# Patient Record
Sex: MALE | Employment: FULL TIME | URBAN - METROPOLITAN AREA
[De-identification: names, ages, dates, MRNs, and addresses within clinical notes are randomized per-mention and may not be internally consistent; named-entity substitution may affect disease eponyms.]

---

## 2020-07-21 ENCOUNTER — ANCILLARY PROCEDURE (OUTPATIENT)
Dept: MRI IMAGING | Facility: CLINIC | Age: 30
End: 2020-07-21
Attending: PSYCHIATRY & NEUROLOGY

## 2020-07-21 DIAGNOSIS — G35 MULTIPLE SCLEROSIS (H): ICD-10-CM

## 2020-07-21 LAB — RADIOLOGIST FLAGS: NORMAL

## 2020-07-21 RX ORDER — GADOBUTROL 604.72 MG/ML
10 INJECTION INTRAVENOUS ONCE
Status: COMPLETED | OUTPATIENT
Start: 2020-07-21 | End: 2020-07-21

## 2020-07-21 RX ADMIN — GADOBUTROL 10 ML: 604.72 INJECTION INTRAVENOUS at 15:21

## 2020-07-21 NOTE — DISCHARGE INSTRUCTIONS
MRI Contrast Discharge Instructions    The IV contrast you received today will pass out of your body in your  urine. This will happen in the next 24 hours. You will not feel this process.  Your urine will not change color.    Drink at least 4 extra glasses of water or juice today (unless your doctor  has restricted your fluids). This reduces the stress on your kidneys.  You may take your regular medicines.    If you are on dialysis: It is best to have dialysis today.    If you have a reaction: Most reactions happen right away. If you have  any new symptoms after leaving the hospital (such as hives or swelling),  call your hospital at the correct number below. Or call your family doctor.  If you have breathing distress or wheezing, call 911.    Special instructions: ***    I have read and understand the above information.    Signature:______________________________________ Date:___________    Staff:__________________________________________ Date:___________     Time:__________    [facility] Departments:    ___[place]: [phone]  ___[facility]: [phone]  ___[place]: [phone] ___[facility]: [phone]  ___[facility]: [phone]  ___[facility]: [phone]  ___[phone]  ___[facility]: [phone]  ___[phone]

## 2020-08-05 DIAGNOSIS — H46.9 OPTIC NEURITIS: Primary | ICD-10-CM

## 2020-08-05 DIAGNOSIS — E55.9 VITAMIN D DEFICIENCY DISEASE: ICD-10-CM

## 2020-08-05 LAB — MISCELLANEOUS TEST: NORMAL

## 2020-08-05 PROCEDURE — 36415 COLL VENOUS BLD VENIPUNCTURE: CPT | Performed by: PSYCHIATRY & NEUROLOGY

## 2020-08-05 PROCEDURE — 86255 FLUORESCENT ANTIBODY SCREEN: CPT | Mod: 90 | Performed by: PSYCHIATRY & NEUROLOGY

## 2020-08-05 PROCEDURE — 99000 SPECIMEN HANDLING OFFICE-LAB: CPT | Performed by: PSYCHIATRY & NEUROLOGY

## 2020-08-05 PROCEDURE — 82306 VITAMIN D 25 HYDROXY: CPT | Performed by: PSYCHIATRY & NEUROLOGY

## 2020-08-06 LAB — DEPRECATED CALCIDIOL+CALCIFEROL SERPL-MC: 48 UG/L (ref 20–75)

## 2020-08-12 LAB
RESULT: NORMAL
SEND OUTS MISC TEST CODE: NORMAL
SEND OUTS MISC TEST SPECIMEN: NORMAL
TEST NAME: NORMAL

## 2020-08-27 ENCOUNTER — HOSPITAL ENCOUNTER (OUTPATIENT)
Dept: MRI IMAGING | Facility: CLINIC | Age: 30
End: 2020-08-27
Attending: PSYCHIATRY & NEUROLOGY
Payer: COMMERCIAL

## 2020-08-27 DIAGNOSIS — G35 MULTIPLE SCLEROSIS (H): ICD-10-CM

## 2020-08-27 DIAGNOSIS — G35 MS (MULTIPLE SCLEROSIS) (H): ICD-10-CM

## 2020-08-27 PROCEDURE — 72157 MRI CHEST SPINE W/O & W/DYE: CPT

## 2020-08-27 PROCEDURE — A9585 GADOBUTROL INJECTION: HCPCS | Performed by: RADIOLOGY

## 2020-08-27 PROCEDURE — 25500064 ZZH RX 255 OP 636: Performed by: RADIOLOGY

## 2020-08-27 PROCEDURE — 72156 MRI NECK SPINE W/O & W/DYE: CPT

## 2020-08-27 RX ORDER — GADOBUTROL 604.72 MG/ML
10 INJECTION INTRAVENOUS ONCE
Status: COMPLETED | OUTPATIENT
Start: 2020-08-27 | End: 2020-08-27

## 2020-08-27 RX ADMIN — GADOBUTROL 10 ML: 604.72 INJECTION INTRAVENOUS at 16:37

## 2020-09-30 DIAGNOSIS — G35 MS (MULTIPLE SCLEROSIS) (H): Primary | ICD-10-CM

## 2020-09-30 LAB
BASOPHILS # BLD AUTO: 0.1 10E9/L (ref 0–0.2)
BASOPHILS NFR BLD AUTO: 0.7 %
DIFFERENTIAL METHOD BLD: NORMAL
EOSINOPHIL # BLD AUTO: 0.4 10E9/L (ref 0–0.7)
EOSINOPHIL NFR BLD AUTO: 5 %
ERYTHROCYTE [DISTWIDTH] IN BLOOD BY AUTOMATED COUNT: 13.6 % (ref 10–15)
HCT VFR BLD AUTO: 48.3 % (ref 40–53)
HGB BLD-MCNC: 16.5 G/DL (ref 13.3–17.7)
LYMPHOCYTES # BLD AUTO: 2 10E9/L (ref 0.8–5.3)
LYMPHOCYTES NFR BLD AUTO: 27 %
MCH RBC QN AUTO: 28 PG (ref 26.5–33)
MCHC RBC AUTO-ENTMCNC: 34.2 G/DL (ref 31.5–36.5)
MCV RBC AUTO: 82 FL (ref 78–100)
MONOCYTES # BLD AUTO: 0.6 10E9/L (ref 0–1.3)
MONOCYTES NFR BLD AUTO: 7.6 %
NEUTROPHILS # BLD AUTO: 4.4 10E9/L (ref 1.6–8.3)
NEUTROPHILS NFR BLD AUTO: 59.7 %
PLATELET # BLD AUTO: 331 10E9/L (ref 150–450)
RBC # BLD AUTO: 5.89 10E12/L (ref 4.4–5.9)
WBC # BLD AUTO: 7.4 10E9/L (ref 4–11)

## 2020-09-30 PROCEDURE — 86787 VARICELLA-ZOSTER ANTIBODY: CPT

## 2020-09-30 PROCEDURE — 85025 COMPLETE CBC W/AUTO DIFF WBC: CPT

## 2020-09-30 PROCEDURE — 36415 COLL VENOUS BLD VENIPUNCTURE: CPT

## 2020-09-30 PROCEDURE — 80076 HEPATIC FUNCTION PANEL: CPT

## 2020-10-01 ENCOUNTER — ALLIED HEALTH/NURSE VISIT (OUTPATIENT)
Dept: FAMILY MEDICINE | Facility: CLINIC | Age: 30
End: 2020-10-01
Payer: COMMERCIAL

## 2020-10-01 DIAGNOSIS — G35 MULTIPLE SCLEROSIS (H): Primary | ICD-10-CM

## 2020-10-01 LAB
ALBUMIN SERPL-MCNC: 4.2 G/DL (ref 3.4–5)
ALP SERPL-CCNC: 99 U/L (ref 40–150)
ALT SERPL W P-5'-P-CCNC: 51 U/L (ref 0–70)
AST SERPL W P-5'-P-CCNC: 23 U/L (ref 0–45)
BILIRUB DIRECT SERPL-MCNC: <0.1 MG/DL (ref 0–0.2)
BILIRUB SERPL-MCNC: 0.4 MG/DL (ref 0.2–1.3)
PROT SERPL-MCNC: 8.2 G/DL (ref 6.8–8.8)

## 2020-10-01 PROCEDURE — 99207 PR NO CHARGE NURSE ONLY: CPT

## 2020-10-01 PROCEDURE — 93005 ELECTROCARDIOGRAM TRACING: CPT

## 2020-10-02 LAB — VZV IGG SER QL IA: 7 AI (ref 0–0.8)

## 2021-01-04 ENCOUNTER — HEALTH MAINTENANCE LETTER (OUTPATIENT)
Age: 31
End: 2021-01-04

## 2021-03-15 ENCOUNTER — IMMUNIZATION (OUTPATIENT)
Dept: NURSING | Facility: CLINIC | Age: 31
End: 2021-03-15
Payer: COMMERCIAL

## 2021-03-15 PROCEDURE — 91300 PR COVID VAC PFIZER DIL RECON 30 MCG/0.3 ML IM: CPT

## 2021-03-15 PROCEDURE — 0001A PR COVID VAC PFIZER DIL RECON 30 MCG/0.3 ML IM: CPT

## 2021-04-05 ENCOUNTER — IMMUNIZATION (OUTPATIENT)
Dept: NURSING | Facility: CLINIC | Age: 31
End: 2021-04-05
Attending: INTERNAL MEDICINE
Payer: COMMERCIAL

## 2021-04-05 PROCEDURE — 91300 PR COVID VAC PFIZER DIL RECON 30 MCG/0.3 ML IM: CPT

## 2021-04-05 PROCEDURE — 0002A PR COVID VAC PFIZER DIL RECON 30 MCG/0.3 ML IM: CPT

## 2021-04-16 ENCOUNTER — ANCILLARY PROCEDURE (OUTPATIENT)
Dept: MRI IMAGING | Facility: CLINIC | Age: 31
End: 2021-04-16
Attending: PSYCHIATRY & NEUROLOGY
Payer: COMMERCIAL

## 2021-04-16 DIAGNOSIS — G35 MULTIPLE SCLEROSIS (H): ICD-10-CM

## 2021-04-16 PROCEDURE — A9585 GADOBUTROL INJECTION: HCPCS | Performed by: RADIOLOGY

## 2021-04-16 PROCEDURE — 70553 MRI BRAIN STEM W/O & W/DYE: CPT | Mod: TC | Performed by: RADIOLOGY

## 2021-04-16 RX ORDER — GADOBUTROL 604.72 MG/ML
10 INJECTION INTRAVENOUS ONCE
Status: COMPLETED | OUTPATIENT
Start: 2021-04-16 | End: 2021-04-16

## 2021-04-16 RX ADMIN — GADOBUTROL 10 ML: 604.72 INJECTION INTRAVENOUS at 14:51

## 2021-08-31 ENCOUNTER — IMMUNIZATION (OUTPATIENT)
Dept: NURSING | Facility: CLINIC | Age: 31
End: 2021-08-31
Payer: COMMERCIAL

## 2021-08-31 PROCEDURE — 0003A PR COVID VAC PFIZER DIL RECON 30 MCG/0.3 ML IM: CPT

## 2021-08-31 PROCEDURE — 91300 PR COVID VAC PFIZER DIL RECON 30 MCG/0.3 ML IM: CPT

## 2021-10-10 ENCOUNTER — HEALTH MAINTENANCE LETTER (OUTPATIENT)
Age: 31
End: 2021-10-10

## 2021-12-23 ENCOUNTER — E-VISIT (OUTPATIENT)
Dept: URGENT CARE | Facility: CLINIC | Age: 31
End: 2021-12-23
Payer: COMMERCIAL

## 2021-12-23 DIAGNOSIS — J02.9 SORE THROAT: ICD-10-CM

## 2021-12-23 DIAGNOSIS — Z20.822 SUSPECTED COVID-19 VIRUS INFECTION: ICD-10-CM

## 2021-12-23 PROCEDURE — 99421 OL DIG E/M SVC 5-10 MIN: CPT | Performed by: EMERGENCY MEDICINE

## 2021-12-23 NOTE — PATIENT INSTRUCTIONS
Dear Rohan Patterson,    Your symptoms show that you may have coronavirus (COVID-19). This illness can cause fever, cough and trouble breathing. Many people get a mild case and get better on their own. Some people can get very sick.    Because you also reported sore throat I would like to also test you for Strep Throat to determine if we need to treat you for that as well.    What should I do?  We would like to test you for Covid-19 virus and Strep Throat. I have placed orders for these tests.     For all employees or close contacts (except [place] and [employer] - see below), go to your iLike home page and scroll down to the section that says  You have an appointment that needs to be scheduled  and click the large green button that says  Schedule Now  and follow the steps to find the next available opening.  It is important that when you are asked what the reason for your appointment is that you mention you need BOTH Covid and Strep tests.  tests.     If you are unable to complete these steps or if you cannot find any available times, please call [phone] to schedule employee testing.     [place] employees or close contacts, please call [phone].   [place] [employer]) employees or close contacts call [phone].    Return to work/school/ guidance:  Please let your workplace manager and staffing office know when your quarantine ends     We can t give you an exact date as it depends on the above. You can calculate this on your own or work with your manager/staffing office to calculate this. (For example if you were exposed on 10/4, you would have to quarantine for 14 full days. That would be through 10/18. You could return on 10/19.)      If you receive a positive COVID-19 test result, follow the guidance of the those who are giving you the results. Usually the return to work is 10 (or in some cases 20 days from symptom onset.) If you work at [employer], you must also be cleared by  Employee Occupational Health and Safety to return to work.        If you receive a negative COVID-19 test result and did not have a high risk exposure to someone with a known positive COVID-19 test, you can return to work once you're free of fever for 24 hours without fever-reducing medication and your symptoms are improving or resolved.      If you receive a negative COVID-19 test and If you had a high risk exposure to someone who has tested positive for COVID-19 then you can return to work 14 days after your last contact with the positive individual    Note: If you have ongoing exposure to the covid positive person, this quarantine period may be more than 14 days. (For example, if you are continued to be exposed to your child who tested positive and cannot isolate from them, then the quarantine of 7-14 days can't start until your child is no longer contagious. This is typically 10 days from onset of the child's symptoms. So the total duration may be 17-24 days in this case.)    Sign up for [employer].   We know it's scary to hear that you might have COVID-19. We want to track your symptoms to make sure you're okay over the next 2 weeks. Please look for an email from [employer]--this is a free, online program that we'll use to keep in touch. To sign up, follow the link in the email you will receive. Learn more at [email or web address]    How can I take care of myself?    Get lots of rest. Drink extra fluids (unless a doctor has told you not to)    Take Tylenol (acetaminophen) or ibuprofen for fever or pain. If you have liver or kidney problems, ask your family doctor if it's okay to take Tylenol o ibuprofen    If you have other health problems (like cancer, heart failure, an organ transplant or severe kidney disease): Call your specialty clinic if you don't feel better in the next 2 days.    Know when to call 911. Emergency warning signs include:  o Trouble breathing or shortness of breath  o Pain  or pressure in the chest that doesn't go away  o Feeling confused like you haven't felt before, or not being able to wake up  o Bluish-colored lips or face    Where can I get more information?  [facility] - About COVID-19:   [email or web address]    CDC - What to Do If You're Sick:   www.cdc.gov/coronavirus/2019-ncov/about/steps-when-sick.html      December 23, 2021  RE:  Rohan Patterson                                                                                                                  [facility] [place] [address]      To whom it may concern:    I evaluated Rohan Patterson on December 23, 2021. Rohan Patterson should be excused from work/school.     They should let their workplace manager and staffing office know when their quarantine ends.    We can not give an exact date as it depends on the information below. They can calculate this on their own or work with their manager/staffing office to calculate this. (For example if they were exposed on 10/04, they would have to quarantine for 14 full days. That would be through 10/18. They could return on 10/19.)    Quarantine Guidelines:      If patient receives a positive COVID-19 test result, they should follow the guidance of those who are giving the results. Usually the return to work is 10 (or in some cases 20 days from symptom onset.) If they work at [employer], they must be cleared by Employee Occupational Health and Safety to return to work.        If patient receives a negative COVID-19 test result and did not have a high risk exposure to someone with a known positive COVID-19 test, they can return to work once they're free of fever for 24 hours without fever-reducing medication and their symptoms are improving or resolved.      If patient receives a negative COVID-19 test and if they had a high risk exposure to someone who has tested positive for COVID-19 then they can return to work 14 days after their last  contact with the positive individual    Note: If there is ongoing exposure to the covid positive person, this quarantine period may be longer than 14 days. (For example, if they are continually exposed to their child, who tested positive and cannot isolate from them, then the quarantine of 7-14 days can't start until their child is no longer contagious. This is typically 10 days from onset to the child's symptoms. So the total duration may be 17-24 days in this case.)     Sincerely,  Ward Lafleur MD

## 2021-12-26 ENCOUNTER — E-VISIT (OUTPATIENT)
Dept: FAMILY MEDICINE | Facility: CLINIC | Age: 31
End: 2021-12-26
Payer: COMMERCIAL

## 2021-12-26 DIAGNOSIS — Z20.822 SUSPECTED COVID-19 VIRUS INFECTION: ICD-10-CM

## 2021-12-26 DIAGNOSIS — J02.9 SORE THROAT: ICD-10-CM

## 2021-12-26 PROCEDURE — 99421 OL DIG E/M SVC 5-10 MIN: CPT | Performed by: PHYSICIAN ASSISTANT

## 2021-12-26 RX ORDER — ACETAMINOPHEN 500 MG
500-1000 TABLET ORAL EVERY 6 HOURS PRN
Qty: 30 TABLET | Refills: 0 | Status: SHIPPED | OUTPATIENT
Start: 2021-12-26 | End: 2024-05-21

## 2021-12-26 RX ORDER — OMEGA-3 FATTY ACIDS/FISH OIL 300-1000MG
200 CAPSULE ORAL EVERY 4 HOURS PRN
Qty: 30 CAPSULE | Refills: 0 | Status: SHIPPED | OUTPATIENT
Start: 2021-12-26

## 2021-12-26 NOTE — PATIENT INSTRUCTIONS
Dear Rohan Patterson,    Alternate Tylenol and Ibuprofen for pain. Gargle with hot salty water. Have warm tea with honey.     Because you also reported sore throat I would like to also test you for Strep Throat to determine if we need to treat you for that as well.    What should I do?  We would like to test you for Covid-19 virus and Strep Throat. I have placed orders for these tests.     For all employees or close contacts (except [place] and [employer] - see below), go to your [employer] home page and scroll down to the section that says  You have an appointment that needs to be scheduled  and click the large green button that says  Schedule Now  and follow the steps to find the next available opening.  It is important that when you are asked what the reason for your appointment is that you mention you need BOTH Covid and Strep tests.  tests.     If you are unable to complete these steps or if you cannot find any available times, please call [phone] to schedule employee testing.     [place] employees or close contacts, please call [phone].   [place] ([employer]) employees or close contacts call [phone].    Return to work/school/ guidance:  Please let your workplace manager and staffing office know when your quarantine ends     We can t give you an exact date as it depends on the above. You can calculate this on your own or work with your manager/staffing office to calculate this. (For example if you were exposed on 10/4, you would have to quarantine for 14 full days. That would be through 10/18. You could return on 10/19.)      If you receive a positive COVID-19 test result, follow the guidance of the those who are giving you the results. Usually the return to work is 10 (or in some cases 20 days from symptom onset.) If you work at [employer], you must also be cleared by Employee Occupational Health and Safety to return to work.        If you receive a negative COVID-19 test result and  did not have a high risk exposure to someone with a known positive COVID-19 test, you can return to work once you're free of fever for 24 hours without fever-reducing medication and your symptoms are improving or resolved.      If you receive a negative COVID-19 test and If you had a high risk exposure to someone who has tested positive for COVID-19 then you can return to work 14 days after your last contact with the positive individual    Note: If you have ongoing exposure to the covid positive person, this quarantine period may be more than 14 days. (For example, if you are continued to be exposed to your child who tested positive and cannot isolate from them, then the quarantine of 7-14 days can't start until your child is no longer contagious. This is typically 10 days from onset of the child's symptoms. So the total duration may be 17-24 days in this case.)    Sign up for [employer].   We know it's scary to hear that you might have COVID-19. We want to track your symptoms to make sure you're okay over the next 2 weeks. Please look for an email from [employer]--this is a free, online program that we'll use to keep in touch. To sign up, follow the link in the email you will receive. Learn more at [email or web address]    How can I take care of myself?    Get lots of rest. Drink extra fluids (unless a doctor has told you not to)    Take Tylenol (acetaminophen) or ibuprofen for fever or pain. If you have liver or kidney problems, ask your family doctor if it's okay to take Tylenol o ibuprofen    If you have other health problems (like cancer, heart failure, an organ transplant or severe kidney disease): Call your specialty clinic if you don't feel better in the next 2 days.    Know when to call 911. Emergency warning signs include:  o Trouble breathing or shortness of breath  o Pain or pressure in the chest that doesn't go away  o Feeling confused like you haven't felt before, or not being able to  wake up  o Bluish-colored lips or face    Where can I get more information?  [facility] - About COVID-19:   [email or web address]    CDC - What to Do If You're Sick:   www.cdc.gov/coronavirus/2019-ncov/about/steps-when-sick.html

## 2022-01-30 ENCOUNTER — HEALTH MAINTENANCE LETTER (OUTPATIENT)
Age: 32
End: 2022-01-30

## 2022-05-17 ENCOUNTER — TELEPHONE (OUTPATIENT)
Dept: NEUROLOGY | Facility: CLINIC | Age: 32
End: 2022-05-17
Payer: COMMERCIAL

## 2022-05-17 NOTE — TELEPHONE ENCOUNTER
Health Call Center    Phone Message    May a detailed message be left on voicemail: yes     Reason for Call: Other: Rohan is a patient of Dr. Man and wants to transfer his care to us. Please review and contact the patient to schedule with Dr. Man.     Action Taken: Message routed to:  [facility] Neurology    Travel Screening: Not Applicable

## 2022-05-18 NOTE — TELEPHONE ENCOUNTER
Action 5/18/22 MV 12.33pm   Action Taken Imaging requset faxed to [facility] + [facility]    5/22/22 MV 9.38am  [facility] images resolved in PACS - waiting for [facility] images    6/1/22 MV 1.05pm  2nd request faxed to  for images  UPDATE 1.26pm: images resolved in PACS         RECORDS RECEIVED FROM: self (prev pt)   REASON FOR VISIT: MS   Date of Appt: 6/8/22   NOTES (FOR ALL VISITS) STATUS DETAILS   OFFICE NOTE from other specialist Care Everywhere Dr Elizabeth Man @  Neuro:  11/25/20 8/11/20 5/11/20 2/7/20 1/24/20    Dr Kate Chawla @ [facility]:  7/30/20 2/20/20 1/16/20    Dr Wilian Guadalupe @ [facility]:  1/16/20   DISCHARGE REPORT from the ER Care Everywhere [facility]:  1/15/20   MEDICATION LIST Care Everywhere    IMAGING  (FOR ALL VISITS)     LUMBAR PUNCTURE Care Everywhere [facility]:  1/27/20   MRI (HEAD, NECK, SPINE) Internal/Received [facility]:  MRI Thoracic Spine 5/26/22  MRI Cervical Spine 5/26/22  MRI Brain 5/26/22  MRI [facility] 4/16/21    [facility]:  MRI Cervical Spine 8/27/20  MRI Thoracic Spine 8/27/20    [facility]:  MRI Brain 7/21/20    [facility]:  MRI Thoracic Spine 1/31/20  MRI Cervical Spine 1/31/20    [facility];  MRI Brain 1/16/20  MRI [facility] 1/16/20

## 2022-06-06 ENCOUNTER — ANCILLARY PROCEDURE (OUTPATIENT)
Dept: MRI IMAGING | Facility: CLINIC | Age: 32
End: 2022-06-06
Attending: PSYCHIATRY & NEUROLOGY
Payer: COMMERCIAL

## 2022-06-06 DIAGNOSIS — G35 MULTIPLE SCLEROSIS (H): ICD-10-CM

## 2022-06-06 PROCEDURE — A9585 GADOBUTROL INJECTION: HCPCS | Mod: JW | Performed by: RADIOLOGY

## 2022-06-06 PROCEDURE — 70553 MRI BRAIN STEM W/O & W/DYE: CPT | Mod: TC | Performed by: RADIOLOGY

## 2022-06-06 PROCEDURE — 72157 MRI CHEST SPINE W/O & W/DYE: CPT | Mod: TC | Performed by: RADIOLOGY

## 2022-06-06 RX ORDER — GADOBUTROL 604.72 MG/ML
0.1 INJECTION INTRAVENOUS ONCE
Status: COMPLETED | OUTPATIENT
Start: 2022-06-06 | End: 2022-06-06

## 2022-06-06 RX ADMIN — GADOBUTROL 11 ML: 604.72 INJECTION INTRAVENOUS at 15:26

## 2022-06-08 ENCOUNTER — PRE VISIT (OUTPATIENT)
Dept: NEUROLOGY | Facility: CLINIC | Age: 32
End: 2022-06-08
Payer: COMMERCIAL

## 2022-06-08 ENCOUNTER — LAB (OUTPATIENT)
Dept: LAB | Facility: CLINIC | Age: 32
End: 2022-06-08
Payer: COMMERCIAL

## 2022-06-08 ENCOUNTER — OFFICE VISIT (OUTPATIENT)
Dept: NEUROLOGY | Facility: CLINIC | Age: 32
End: 2022-06-08
Attending: PSYCHIATRY & NEUROLOGY
Payer: COMMERCIAL

## 2022-06-08 VITALS
BODY MASS INDEX: 31.83 KG/M2 | OXYGEN SATURATION: 100 % | WEIGHT: 248 LBS | SYSTOLIC BLOOD PRESSURE: 137 MMHG | DIASTOLIC BLOOD PRESSURE: 89 MMHG | HEIGHT: 74 IN

## 2022-06-08 DIAGNOSIS — E55.9 VITAMIN D DEFICIENCY: ICD-10-CM

## 2022-06-08 DIAGNOSIS — G35 MS (MULTIPLE SCLEROSIS) (H): ICD-10-CM

## 2022-06-08 DIAGNOSIS — G35 MS (MULTIPLE SCLEROSIS) (H): Primary | ICD-10-CM

## 2022-06-08 LAB
BASOPHILS # BLD AUTO: 0 10E3/UL (ref 0–0.2)
BASOPHILS NFR BLD AUTO: 1 %
DEPRECATED CALCIDIOL+CALCIFEROL SERPL-MC: 31 UG/L (ref 20–75)
EOSINOPHIL # BLD AUTO: 0.1 10E3/UL (ref 0–0.7)
EOSINOPHIL NFR BLD AUTO: 3 %
ERYTHROCYTE [DISTWIDTH] IN BLOOD BY AUTOMATED COUNT: 13 % (ref 10–15)
HCT VFR BLD AUTO: 45.4 % (ref 40–53)
HGB BLD-MCNC: 14.9 G/DL (ref 13.3–17.7)
IMM GRANULOCYTES # BLD: 0 10E3/UL
IMM GRANULOCYTES NFR BLD: 0 %
LYMPHOCYTES # BLD AUTO: 0.4 10E3/UL (ref 0.8–5.3)
LYMPHOCYTES NFR BLD AUTO: 16 %
MCH RBC QN AUTO: 28 PG (ref 26.5–33)
MCHC RBC AUTO-ENTMCNC: 32.8 G/DL (ref 31.5–36.5)
MCV RBC AUTO: 85 FL (ref 78–100)
MONOCYTES # BLD AUTO: 0.4 10E3/UL (ref 0–1.3)
MONOCYTES NFR BLD AUTO: 18 %
NEUTROPHILS # BLD AUTO: 1.5 10E3/UL (ref 1.6–8.3)
NEUTROPHILS NFR BLD AUTO: 62 %
NRBC # BLD AUTO: 0 10E3/UL
NRBC BLD AUTO-RTO: 0 /100
PLATELET # BLD AUTO: 244 10E3/UL (ref 150–450)
RBC # BLD AUTO: 5.33 10E6/UL (ref 4.4–5.9)
WBC # BLD AUTO: 2.4 10E3/UL (ref 4–11)

## 2022-06-08 PROCEDURE — G0463 HOSPITAL OUTPT CLINIC VISIT: HCPCS

## 2022-06-08 PROCEDURE — 99214 OFFICE O/P EST MOD 30 MIN: CPT | Performed by: PSYCHIATRY & NEUROLOGY

## 2022-06-08 PROCEDURE — 85025 COMPLETE CBC W/AUTO DIFF WBC: CPT | Performed by: PATHOLOGY

## 2022-06-08 PROCEDURE — 99000 SPECIMEN HANDLING OFFICE-LAB: CPT | Performed by: PATHOLOGY

## 2022-06-08 PROCEDURE — 82306 VITAMIN D 25 HYDROXY: CPT | Mod: 90 | Performed by: PATHOLOGY

## 2022-06-08 PROCEDURE — 36415 COLL VENOUS BLD VENIPUNCTURE: CPT | Performed by: PATHOLOGY

## 2022-06-08 RX ORDER — FINGOLIMOD HYDROCHLORIDE 0.5 MG/1
1 CAPSULE ORAL DAILY
COMMUNITY
Start: 2021-01-01 | End: 2022-06-08

## 2022-06-08 RX ORDER — FINGOLIMOD HYDROCHLORIDE 0.5 MG/1
0.5 CAPSULE ORAL DAILY
Qty: 30 CAPSULE | Refills: 11 | Status: SHIPPED | OUTPATIENT
Start: 2022-06-08 | End: 2023-02-27

## 2022-06-08 NOTE — PROGRESS NOTES
"Date of Service: 6/8/2022    [facility] Neurology   MS Clinic Follow-up     Subjective: 32-year-old otherwise healthy man who presents in follow-up for multiple sclerosis.    He has not experienced any new symptoms related to multiple sclerosis.  He has not experienced any recurrence of tingling.    He did have COVID in December.  He was fairly sick for approximately 4 days.  He did receive IV antibody treatment.  This was effective.    He has not noticed any substantial side effects of Gilenya.  He has not struggled with infections otherwise.    D3 2000 international unit(s) daily    Disease onset; R ON, 1/2020, age 29  Last relapse: \"    DMD hx:   Glatiramer acetate 40 mg 2/2020 - 9/2020, radiologic progression  Gilenya 10/14/20 - present      No Known Allergies    Current Outpatient Medications   Medication     fingolimod (GILENYA) 0.5 MG capsule     vitamin D3 (CHOLECALCIFEROL) 1.25 MG (68305 UT) capsule     acetaminophen (TYLENOL) 500 MG tablet     ibuprofen (ADVIL/MOTRIN) 200 MG capsule     No current facility-administered medications for this visit.        Past medical, surgical, social and family history was personally reviewed. Pertinent details noted above.     Physical Examination:   /89 (BP Location: Left arm, Patient Position: Chair, Cuff Size: Adult Large)   Ht 1.88 m (6' 2\")   Wt 112.5 kg (248 lb)   SpO2 100%   BMI 31.84 kg/m      General: no acute distress  Cranial nerves:   VFFC  PERRL w/no RAPD  EOM full w/no NABIL   Face symmetric  Hearing intact  No dysarthria   Motor:   Tone is normal   Bulk is normal     R L  Deltoid  5 5  Biceps  5 5  Triceps 5 5  Wrist ext 5 5  Finger ext 5 5  Finger abd 5 5    Hip flexion 5 5  Knee flexion 5 5  Knee ext 5 5  Ankle d/f 5 5    Reflexes: 2+ and symmetric throughout, babinski absent bilaterally  Sensory: vibration is minimally reduced in the toes, JPS normal in the toes   Romberg is absent  Coordination: no ataxia or dysmetria  Gait: normal base and " stride, tandem gait is intact, able to balance on one foot and hop x 5 bilaterally    Tests/Imaging:   CSF   9 OCB  3 wbc  IgG index normal     MOG neg  AQP4 neg    NESSA virus Ab n/t  Vitamin D 20    Abs lymph 300     MRI brain   1/2020 - several periventricular demyelinating lesions, 1 gd+; longitudinally extensive enhancement of the right optic nerve  7/2020 - 2 gd+ lesions  4/2021 - no new lesions, gd-   6/2022 - no new lesions, gd-     MRI cervical spine   1/2020 - no demyelinating lesions  6/2022 - no definite lesions,2 spots but favor artifact b/c only noted on one view    MRI thoracic spine   1/2020 - no demyelinating lesions   6/2022 - no lesions    Assessment: 32-year-old man with relapsing remitting multiple sclerosis who appears to be clinically and radiologically stable on Gilenya.  MRI imaging of the cervical spine was personally reviewed with the patient.  I explained why the small spots noted are favored to be artifact.  Artifact would also correspond with his clinical stability.    Vitamin D deficiency. Will reassess today.     Plan:   -Blood work today  - Continue Gilenya  - Follow-up in 6 months    Note was completed with the assistance of Dragon Fluency software which can often result in accidental word substitutions.     A total of 30 minutes on the date of service were spent in the care of this patient.   Elizabeth Man MD on 6/8/2022 at 2:36 PM

## 2022-06-08 NOTE — LETTER
Date:June 14, 2022      Provider requested that no letter be sent. Do not send.       [facility]

## 2022-06-08 NOTE — LETTER
"6/8/2022       RE: Rohan Patterson  [address] [place] [address]     Dear Colleague,    Thank you for referring your patient, Rohan Patterson, to the [facility] MULTIPLE SCLEROSIS CLINIC [place] [facility]. Please see a copy of my visit note below.    Date of Service: 6/8/2022    [facility] Neurology   MS Clinic Follow-up     Subjective: 32-year-old otherwise healthy man who presents in follow-up for multiple sclerosis.    He has not experienced any new symptoms related to multiple sclerosis.  He has not experienced any recurrence of tingling.    He did have COVID in December.  He was fairly sick for approximately 4 days.  He did receive IV antibody treatment.  This was effective.    He has not noticed any substantial side effects of Gilenya.  He has not struggled with infections otherwise.    D3 2000 international unit(s) daily    Disease onset; R ON, 1/2020, age 29  Last relapse: \"    DMD hx:   Glatiramer acetate 40 mg 2/2020 - 9/2020, radiologic progression  Gilenya 10/14/20 - present      No Known Allergies    Current Outpatient Medications   Medication     fingolimod (GILENYA) 0.5 MG capsule     vitamin D3 (CHOLECALCIFEROL) 1.25 MG (07482 UT) capsule     acetaminophen (TYLENOL) 500 MG tablet     ibuprofen (ADVIL/MOTRIN) 200 MG capsule     No current facility-administered medications for this visit.        Past medical, surgical, social and family history was personally reviewed. Pertinent details noted above.     Physical Examination:   /89 (BP Location: Left arm, Patient Position: Chair, Cuff Size: Adult Large)   Ht 1.88 m (6' 2\")   Wt 112.5 kg (248 lb)   SpO2 100%   BMI 31.84 kg/m      General: no acute distress  Cranial nerves:   VFFC  PERRL w/no RAPD  EOM full w/no NABIL   Face symmetric  Hearing intact  No dysarthria   Motor:   Tone is normal   Bulk is normal     R L  Deltoid  5 5  Biceps  5 5  Triceps 5 5  Wrist " ext 5 5  Finger ext 5 5  Finger abd 5 5    Hip flexion 5 5  Knee flexion 5 5  Knee ext 5 5  Ankle d/f 5 5    Reflexes: 2+ and symmetric throughout, babinski absent bilaterally  Sensory: vibration is minimally reduced in the toes, JPS normal in the toes   Romberg is absent  Coordination: no ataxia or dysmetria  Gait: normal base and stride, tandem gait is intact, able to balance on one foot and hop x 5 bilaterally    Tests/Imaging:   CSF   9 OCB  3 wbc  IgG index normal     MOG neg  AQP4 neg    NESSA virus Ab n/t  Vitamin D 20    Abs lymph 300     MRI brain   1/2020 - several periventricular demyelinating lesions, 1 gd+; longitudinally extensive enhancement of the right optic nerve  7/2020 - 2 gd+ lesions  4/2021 - no new lesions, gd-   6/2022 - no new lesions, gd-     MRI cervical spine   1/2020 - no demyelinating lesions  6/2022 - no definite lesions,2 spots but favor artifact b/c only noted on one view    MRI thoracic spine   1/2020 - no demyelinating lesions   6/2022 - no lesions    Assessment: 32-year-old man with relapsing remitting multiple sclerosis who appears to be clinically and radiologically stable on Gilenya.  MRI imaging of the cervical spine was personally reviewed with the patient.  I explained why the small spots noted are favored to be artifact.  Artifact would also correspond with his clinical stability.    Vitamin D deficiency. Will reassess today.     Plan:   -Blood work today  - Continue Gilenya  - Follow-up in 6 months    Note was completed with the assistance of Dragon Fluency software which can often result in accidental word substitutions.     A total of 30 minutes on the date of service were spent in the care of this patient.   Elizabeth Man MD on 6/8/2022 at 2:36 PM          Again, thank you for allowing me to participate in the care of your patient.      Sincerely,    Elizabeth Man MD

## 2022-06-09 DIAGNOSIS — G35 MS (MULTIPLE SCLEROSIS) (H): Primary | ICD-10-CM

## 2022-06-09 DIAGNOSIS — E55.9 VITAMIN D DEFICIENCY: ICD-10-CM

## 2022-09-13 ENCOUNTER — TELEPHONE (OUTPATIENT)
Dept: NEUROLOGY | Facility: CLINIC | Age: 32
End: 2022-09-13

## 2022-09-13 NOTE — TELEPHONE ENCOUNTER
PA Initiation    Medication: Gilenya 0.5MG Capsules (PA PENDING)  Insurance Company: [employer] - Phone [phone] Fax [phone]  Pharmacy Filling the Rx: [facility] - [place], [place] - [address]  Filling Pharmacy Phone: [phone]  Filling Pharmacy Fax: [phone]  Start Date: 9/13/2022        Thank you,    Margaret Fonseca [facility]  Specialty Pharmacy Clinic Liaison - CardiologyNeurologyMultiple Sclerosis  [facility] [address] [address]  Ph: [phone] Fax: [phone]  [email or web address]

## 2022-09-14 NOTE — TELEPHONE ENCOUNTER
Prior Authorization Approval    Authorization Effective Date: 9/14/2022  Authorization Expiration Date: 9/14/2023  Medication: Gilenya 0.5MG Capsules (PA APPROVED)  Approved Dose/Quantity: 30 days  Reference #:     Insurance Company: [employer] - Phone [phone] Fax [phone]  Expected CoPay:       CoPay Card Available: Yes    Foundation Assistance Needed:    Which Pharmacy is filling the prescription (Not needed for infusion/clinic administered): [facility] - [place], [place] - [address] [facility]  Pharmacy Notified:    Patient Notified:            Thank you,    Margaret Fonseca h-T  Specialty Pharmacy Clinic Liaison - CardiologyNeurologyMultiple [facility]  [address] [place] [id number]  Ph: [phone] Fax: [phone]  [email or web address]

## 2022-09-24 ENCOUNTER — HEALTH MAINTENANCE LETTER (OUTPATIENT)
Age: 32
End: 2022-09-24

## 2022-12-01 ENCOUNTER — LAB (OUTPATIENT)
Dept: LAB | Facility: CLINIC | Age: 32
End: 2022-12-01
Payer: COMMERCIAL

## 2022-12-01 DIAGNOSIS — E55.9 VITAMIN D DEFICIENCY: ICD-10-CM

## 2022-12-01 DIAGNOSIS — G35 MS (MULTIPLE SCLEROSIS) (H): ICD-10-CM

## 2022-12-01 LAB
BASOPHILS # BLD AUTO: 0 10E3/UL (ref 0–0.2)
BASOPHILS NFR BLD AUTO: 1 %
DEPRECATED CALCIDIOL+CALCIFEROL SERPL-MC: 59 UG/L (ref 20–75)
EOSINOPHIL # BLD AUTO: 0.1 10E3/UL (ref 0–0.7)
EOSINOPHIL NFR BLD AUTO: 4 %
ERYTHROCYTE [DISTWIDTH] IN BLOOD BY AUTOMATED COUNT: 13.2 % (ref 10–15)
HCT VFR BLD AUTO: 43.7 % (ref 40–53)
HGB BLD-MCNC: 14.5 G/DL (ref 13.3–17.7)
IMM GRANULOCYTES # BLD: 0 10E3/UL
IMM GRANULOCYTES NFR BLD: 0 %
LYMPHOCYTES # BLD AUTO: 0.6 10E3/UL (ref 0.8–5.3)
LYMPHOCYTES NFR BLD AUTO: 21 %
MCH RBC QN AUTO: 27.9 PG (ref 26.5–33)
MCHC RBC AUTO-ENTMCNC: 33.2 G/DL (ref 31.5–36.5)
MCV RBC AUTO: 84 FL (ref 78–100)
MONOCYTES # BLD AUTO: 0.5 10E3/UL (ref 0–1.3)
MONOCYTES NFR BLD AUTO: 18 %
NEUTROPHILS # BLD AUTO: 1.5 10E3/UL (ref 1.6–8.3)
NEUTROPHILS NFR BLD AUTO: 56 %
NRBC # BLD AUTO: 0 10E3/UL
NRBC BLD AUTO-RTO: 0 /100
PLATELET # BLD AUTO: 234 10E3/UL (ref 150–450)
RBC # BLD AUTO: 5.2 10E6/UL (ref 4.4–5.9)
WBC # BLD AUTO: 2.7 10E3/UL (ref 4–11)

## 2022-12-01 PROCEDURE — 99000 SPECIMEN HANDLING OFFICE-LAB: CPT | Performed by: PATHOLOGY

## 2022-12-01 PROCEDURE — 85025 COMPLETE CBC W/AUTO DIFF WBC: CPT | Performed by: PATHOLOGY

## 2022-12-01 PROCEDURE — 82306 VITAMIN D 25 HYDROXY: CPT | Mod: 90 | Performed by: PATHOLOGY

## 2022-12-01 PROCEDURE — 36415 COLL VENOUS BLD VENIPUNCTURE: CPT | Performed by: PATHOLOGY

## 2022-12-07 ENCOUNTER — OFFICE VISIT (OUTPATIENT)
Dept: NEUROLOGY | Facility: CLINIC | Age: 32
End: 2022-12-07
Attending: PSYCHIATRY & NEUROLOGY
Payer: COMMERCIAL

## 2022-12-07 VITALS
BODY MASS INDEX: 30.39 KG/M2 | WEIGHT: 236.7 LBS | SYSTOLIC BLOOD PRESSURE: 146 MMHG | HEART RATE: 74 BPM | DIASTOLIC BLOOD PRESSURE: 101 MMHG | OXYGEN SATURATION: 98 %

## 2022-12-07 DIAGNOSIS — G35 MS (MULTIPLE SCLEROSIS) (H): Primary | ICD-10-CM

## 2022-12-07 PROCEDURE — 99214 OFFICE O/P EST MOD 30 MIN: CPT | Mod: GC | Performed by: PSYCHIATRY & NEUROLOGY

## 2022-12-07 PROCEDURE — G0463 HOSPITAL OUTPT CLINIC VISIT: HCPCS

## 2022-12-07 ASSESSMENT — PAIN SCALES - GENERAL: PAINLEVEL: NO PAIN (0)

## 2022-12-07 NOTE — LETTER
2022      Re: Rohan Patterson    1990        To whom it may concern:       Mr. Patterson is currently under my care for multiple sclerosis.  He taking a medication called gilenya (fingolimod) on a daily basis.  It is imperative for his ongoing health that he does not have any disruptions in his therapy.  Please allow him to carry this medication with him during his upcoming international travel.      Sincerely,         Elizabeth Man MD

## 2022-12-07 NOTE — PROGRESS NOTES
"  Neurology Clinic Visit    Reason: Multiple Sclerosis       12/07/2022   Source of information: Patient and chart review    History of Present Symptom:  Rohan Patterson is a 32 year old male with a PMH significant for multiple sclerosis who presents today for follow up.      Reports tingling in fingers or toes with heat or fatigue but this is very rare. For instance if he is doing a rigorous exercise. This is similar to symptoms that he had at the time that he had a new lesion on imaging in 2020.     He denies any residual vision change. He denies any change to sensation, strength, balance, or vision recently. He does not have any bowel or bladder symptoms.     He continues to take Gilenya consistently with no side effects. He reports not changes to his health in general.     D3 2000 international unit(s) daily     Disease onset; R ON, 1/2020, age 29  Last relapse: \"    DMD hx:   Glatiramer acetate 40 mg 2/2020 - 9/2020, radiologic progression  Gilenya 10/14/20 - present    The patient's medical, surgical, social, and family history were personally reviewed with the patient.  No past medical history on file.   No past surgical history on file.  Social History     Tobacco Use     Smoking status: Never     Smokeless tobacco: Never   Substance Use Topics     Alcohol use: Not Currently     Drug use: Not Currently     No family history on file.  Current Outpatient Medications   Medication     acetaminophen (TYLENOL) 500 MG tablet     fingolimod (GILENYA) 0.5 MG capsule     ibuprofen (ADVIL/MOTRIN) 200 MG capsule     vitamin D3 (CHOLECALCIFEROL) 1.25 MG (52975 UT) capsule     No current facility-administered medications for this visit.     No Known Allergies      Review of Systems:  14-point review of systems was completed. The pertinent positives and negatives are in the HPI.    Physical Examination   Vitals: There were no vitals taken for this visit.  General: Patient appears comfortable in no acute distress.   HEENT: " NC/AT, no icterus, moist mucous membranes  Chest: non-labored on RA  Extremities: Warm, no edema  Skin: No rash or lesion   Psych: Affect appropriate for situation   Neuro:  Mental status: Awake, alert, attentive. Language is fluent with intact comprehension of commands.  Cranial nerves: PERRL with no relative afferent pupillary defect, conjugate gaze, EOMI, visual fields intact, face symmetric, shoulder shrug strong, tongue protrusion/uvula midline, no dysarthria.   Motor: Normal muscle bulk and tone. No abnormal movements. 5/5 strength in 4/4 extremities.     R L  Deltoid  5 5  Biceps  5 5  Triceps 5 5  Wrist ext 5 5  Finger ext 5 5  Finger abd 5 5    Hip flexion 5 5  Knee flexion 5 5  Knee ext 5 5  Dorsiflexion 5 5    Reflexes: 2+ reflexes symmetric in biceps, brachioradialis, patellae, and achilles. No clonus, toes down-going.  Sensory:  Romberg is negative.   Coordination: FNF without ataxia or dysmetria.    Gait: Normal width, stride length, turn, with symmetric arm swing. Tandem walk intact. Able to balance easily on one leg bilaterally.     Laboratory:  Vit D 59     CBC RESULTS: Recent Labs   Lab Test 22  1607   WBC 2.7*   RBC 5.20   HGB 14.5   HCT 43.7   MCV 84   MCH 27.9   MCHC 33.2   RDW 13.2        Abs lymph count 0.6   Imagin/2022   MRI brain stable demyelinating lesions   C-spine 2 areas that may represent artifact   T-spine no demyelinating lesions     Assessment/Plan:  Rohan Patterson is a 32 year old male who presents for follow up for multiple sclerosis. He remains stable on gilenya with minimal symptoms on a day to day basis related to MS. His last MRI was in  and remained stable.      He did restart vitamin D3 more consistently and is taking 2000 international unit(s) daily. His vitamin D was in a good range on recent check. His white count remains low at 2.7 with borderline low neutrophile count. This is okay as long as it does not trend down over time.     Patient  informed about Paxlovid for Covid treatment.     -continue Gilenya   -continue vit D dose   -blood work, MRI and follow up in 6 months     Patient seen and discussed with Dr. Man.   I have reviewed the plan with the patient, who is in agreement.      Jacquelyn Flores, DO  Multiple Sclerosis Fellow

## 2022-12-07 NOTE — LETTER
"12/7/2022       RE: Rohan Patterson  [address] [place] [address]     Dear Colleague,    Thank you for referring your patient, Rohan Patterson, to the [facility] MULTIPLE SCLEROSIS CLINIC [place] at [facility]. Please see a copy of my visit note below.      Neurology Clinic Visit    Reason: Multiple Sclerosis       12/07/2022   Source of information: Patient and chart review    History of Present Symptom:  Rohan Patterson is a 32 year old male with a PMH significant for multiple sclerosis who presents today for follow up.      Reports tingling in fingers or toes with heat or fatigue but this is very rare. For instance if he is doing a rigorous exercise. This is similar to symptoms that he had at the time that he had a new lesion on imaging in 2020.     He denies any residual vision change. He denies any change to sensation, strength, balance, or vision recently. He does not have any bowel or bladder symptoms.     He continues to take Gilenya consistently with no side effects. He reports not changes to his health in general.     D3 2000 international unit(s) daily     Disease onset; R ON, 1/2020, age 29  Last relapse: \"    DMD hx:   Glatiramer acetate 40 mg 2/2020 - 9/2020, radiologic progression  Gilenya 10/14/20 - present    The patient's medical, surgical, social, and family history were personally reviewed with the patient.  No past medical history on file.   No past surgical history on file.  Social History     Tobacco Use     Smoking status: Never     Smokeless tobacco: Never   Substance Use Topics     Alcohol use: Not Currently     Drug use: Not Currently     No family history on file.  Current Outpatient Medications   Medication     acetaminophen (TYLENOL) 500 MG tablet     fingolimod (GILENYA) 0.5 MG capsule     ibuprofen (ADVIL/MOTRIN) 200 MG capsule     vitamin D3 (CHOLECALCIFEROL) 1.25 MG (46448 UT) capsule     No current " facility-administered medications for this visit.     No Known Allergies      Review of Systems:  14-point review of systems was completed. The pertinent positives and negatives are in the HPI.    Physical Examination   Vitals: There were no vitals taken for this visit.  General: Patient appears comfortable in no acute distress.   HEENT: NC/AT, no icterus, moist mucous membranes  Chest: non-labored on RA  Extremities: Warm, no edema  Skin: No rash or lesion   Psych: Affect appropriate for situation   Neuro:  Mental status: Awake, alert, attentive. Language is fluent with intact comprehension of commands.  Cranial nerves: PERRL with no relative afferent pupillary defect, conjugate gaze, EOMI, visual fields intact, face symmetric, shoulder shrug strong, tongue protrusion/uvula midline, no dysarthria.   Motor: Normal muscle bulk and tone. No abnormal movements. 5/5 strength in 4/4 extremities.     R L  Deltoid  5 5  Biceps  5 5  Triceps 5 5  Wrist ext 5 5  Finger ext 5 5  Finger abd 5 5    Hip flexion 5 5  Knee flexion 5 5  Knee ext 5 5  Dorsiflexion 5 5    Reflexes: 2+ reflexes symmetric in biceps, brachioradialis, patellae, and achilles. No clonus, toes down-going.  Sensory:  Romberg is negative.   Coordination: FNF without ataxia or dysmetria.    Gait: Normal width, stride length, turn, with symmetric arm swing. Tandem walk intact. Able to balance easily on one leg bilaterally.     Laboratory:  Vit D 59     CBC RESULTS: Recent Labs   Lab Test 22  1607   WBC 2.7*   RBC 5.20   HGB 14.5   HCT 43.7   MCV 84   MCH 27.9   MCHC 33.2   RDW 13.2        Abs lymph count 0.6   Imagin/2022   MRI brain stable demyelinating lesions   C-spine 2 areas that may represent artifact   T-spine no demyelinating lesions     Assessment/Plan:  Rohan Patterson is a 32 year old male who presents for follow up for multiple sclerosis. He remains stable on gilenya with minimal symptoms on a day to day basis related to MS. His  last MRI was in June and remained stable.      He did restart vitamin D3 more consistently and is taking 2000 international unit(s) daily. His vitamin D was in a good range on recent check. His white count remains low at 2.7 with borderline low neutrophile count. This is okay as long as it does not trend down over time.     Patient informed about Paxlovid for Covid treatment.     -continue Gilenya   -continue vit D dose   -blood work, MRI and follow up in 6 months     Patient seen and discussed with Dr. Man.   I have reviewed the plan with the patient, who is in agreement.      Jacquelyn Flores DO  Multiple Sclerosis Fellow             Attestation signed by Elizabeth Man MD at 12/13/2022  8:21 AM:  I personally saw and evaluated Mr. Patterson with Dr. Flores on the date of service.  I have reviewed the above documentation and agree with the findings and recommendations.     Discussed precautions while traveling     Letter provided for upcoming travel.     Discussed monitoring of neutrophil count with on-going gilenya use     Okay to switch to generic fingolimod if required by insurance policy     A total of 30 minutes were personally spent in the care of this patient on the date of service.     Elizabeth Man MD on 12/13/2022 at 8:18 AM        Again, thank you for allowing me to participate in the care of your patient.      Sincerely,    Elizabeth Man MD

## 2022-12-07 NOTE — PATIENT INSTRUCTIONS
Your neutrophile count is borderline low, we are keeping track of this and if it would drop too low we may have to change medications.     Because you are on Gilenya you would be eligible for Paxlovid if you were to get COVID. Please contact us if you need this in the future.     Continue vitamin D as you are    Follow up 6 months after blood work and MRI.

## 2023-02-27 ENCOUNTER — TELEPHONE (OUTPATIENT)
Dept: NEUROLOGY | Facility: CLINIC | Age: 33
End: 2023-02-27
Payer: COMMERCIAL

## 2023-02-27 NOTE — TELEPHONE ENCOUNTER
PA Initiation    Medication: Fingolimod 0.5MG (PA PENDING)  Insurance Company: [employer] - Phone [phone] Fax [phone]  Pharmacy Filling the Rx: [facility] - [place], [place] - [address]   Filling Pharmacy Phone:    Filling Pharmacy Fax:    Start Date: 2/27/2022      Rohan Patterson ([place]: [id number])        Thank you,    Margaret Fonseca [facility]  Specialty Pharmacy Clinic Liaison - CardiologyNeurologyMultiple Sclerosis  [facility] [address]  3[address] [place] [address]  Ph: [phone] Fax: [phone]  [email or web address]

## 2023-02-28 NOTE — TELEPHONE ENCOUNTER
Prior Authorization Approval    Authorization Effective Date: 2/27/2023  Authorization Expiration Date: 9/14/2023  Medication: Fingolimod 0.5MG (PA APPROVED)  Approved Dose/Quantity: 30 days  Reference #:     Insurance Company: [employer] - Phone [phone] Fax [phone]  Expected CoPay:       CoPay Card Available: Yes    Foundation Assistance Needed:    Which Pharmacy is filling the prescription (Not needed for infusion/clinic administered): [facility] [place] [place] [facility]  Pharmacy Notified:    Patient Notified:          Thank you,    Margaret Fonseca h-T  Specialty Pharmacy Clinic Liaison - CardiologyNeurologyMultiple Sclerosis  [facility]  [address] [place] [address]  Ph: [phone] Fax: [phone]  [email or web address]

## 2023-05-08 ENCOUNTER — HEALTH MAINTENANCE LETTER (OUTPATIENT)
Age: 33
End: 2023-05-08

## 2023-05-16 ENCOUNTER — HOSPITAL ENCOUNTER (OUTPATIENT)
Dept: MRI IMAGING | Facility: HOSPITAL | Age: 33
Discharge: HOME | End: 2023-05-16
Attending: PSYCHIATRY & NEUROLOGY
Payer: COMMERCIAL

## 2023-05-16 DIAGNOSIS — G35 MS (MULTIPLE SCLEROSIS) (H): ICD-10-CM

## 2023-05-16 PROCEDURE — A9585 GADOBUTROL INJECTION: HCPCS | Performed by: PSYCHIATRY & NEUROLOGY

## 2023-05-16 PROCEDURE — 72156 MRI NECK SPINE W/O & W/DYE: CPT

## 2023-05-16 PROCEDURE — 70553 MRI BRAIN STEM W/O & W/DYE: CPT

## 2023-05-16 PROCEDURE — 255N000002 HC RX 255 OP 636: Performed by: PSYCHIATRY & NEUROLOGY

## 2023-05-16 RX ORDER — GADOBUTROL 604.72 MG/ML
0.1 INJECTION INTRAVENOUS ONCE
Status: COMPLETED | OUTPATIENT
Start: 2023-05-16 | End: 2023-05-16

## 2023-05-16 RX ADMIN — GADOBUTROL 10 ML: 604.72 INJECTION INTRAVENOUS at 15:42

## 2023-06-01 ENCOUNTER — LAB (OUTPATIENT)
Dept: LAB | Facility: CLINIC | Age: 33
End: 2023-06-01
Payer: COMMERCIAL

## 2023-06-01 DIAGNOSIS — G35 MS (MULTIPLE SCLEROSIS) (H): ICD-10-CM

## 2023-06-01 LAB
ALBUMIN SERPL BCG-MCNC: 4.4 G/DL (ref 3.5–5.2)
ALP SERPL-CCNC: 92 U/L (ref 40–129)
ALT SERPL W P-5'-P-CCNC: 43 U/L (ref 10–50)
AST SERPL W P-5'-P-CCNC: 29 U/L (ref 10–50)
BASOPHILS # BLD AUTO: 0 10E3/UL (ref 0–0.2)
BASOPHILS NFR BLD AUTO: 1 %
BILIRUB DIRECT SERPL-MCNC: <0.2 MG/DL (ref 0–0.3)
BILIRUB SERPL-MCNC: 0.3 MG/DL
EOSINOPHIL # BLD AUTO: 0.1 10E3/UL (ref 0–0.7)
EOSINOPHIL NFR BLD AUTO: 2 %
ERYTHROCYTE [DISTWIDTH] IN BLOOD BY AUTOMATED COUNT: 12.6 % (ref 10–15)
HCT VFR BLD AUTO: 45.7 % (ref 40–53)
HGB BLD-MCNC: 15.6 G/DL (ref 13.3–17.7)
IMM GRANULOCYTES # BLD: 0 10E3/UL
IMM GRANULOCYTES NFR BLD: 0 %
LYMPHOCYTES # BLD AUTO: 0.5 10E3/UL (ref 0.8–5.3)
LYMPHOCYTES NFR BLD AUTO: 17 %
MCH RBC QN AUTO: 28.7 PG (ref 26.5–33)
MCHC RBC AUTO-ENTMCNC: 34.1 G/DL (ref 31.5–36.5)
MCV RBC AUTO: 84 FL (ref 78–100)
MONOCYTES # BLD AUTO: 0.5 10E3/UL (ref 0–1.3)
MONOCYTES NFR BLD AUTO: 14 %
NEUTROPHILS # BLD AUTO: 2.2 10E3/UL (ref 1.6–8.3)
NEUTROPHILS NFR BLD AUTO: 67 %
PLATELET # BLD AUTO: 276 10E3/UL (ref 150–450)
PROT SERPL-MCNC: 6.8 G/DL (ref 6.4–8.3)
RBC # BLD AUTO: 5.44 10E6/UL (ref 4.4–5.9)
WBC # BLD AUTO: 3.2 10E3/UL (ref 4–11)

## 2023-06-01 PROCEDURE — 36415 COLL VENOUS BLD VENIPUNCTURE: CPT

## 2023-06-01 PROCEDURE — 99000 SPECIMEN HANDLING OFFICE-LAB: CPT

## 2023-06-01 PROCEDURE — 80076 HEPATIC FUNCTION PANEL: CPT

## 2023-06-01 PROCEDURE — 87798 DETECT AGENT NOS DNA AMP: CPT | Mod: 90

## 2023-06-01 PROCEDURE — 85025 COMPLETE CBC W/AUTO DIFF WBC: CPT

## 2023-06-03 LAB — JCPYV DNA SPEC QL NAA+PROBE: NOT DETECTED

## 2023-06-27 ENCOUNTER — OFFICE VISIT (OUTPATIENT)
Dept: NEUROLOGY | Facility: CLINIC | Age: 33
End: 2023-06-27
Attending: PSYCHIATRY & NEUROLOGY
Payer: COMMERCIAL

## 2023-06-27 VITALS
HEART RATE: 70 BPM | WEIGHT: 225.7 LBS | BODY MASS INDEX: 28.98 KG/M2 | SYSTOLIC BLOOD PRESSURE: 146 MMHG | OXYGEN SATURATION: 100 % | DIASTOLIC BLOOD PRESSURE: 97 MMHG

## 2023-06-27 DIAGNOSIS — G35 MS (MULTIPLE SCLEROSIS) (H): Primary | ICD-10-CM

## 2023-06-27 PROCEDURE — G0463 HOSPITAL OUTPT CLINIC VISIT: HCPCS | Performed by: PSYCHIATRY & NEUROLOGY

## 2023-06-27 PROCEDURE — 99214 OFFICE O/P EST MOD 30 MIN: CPT | Performed by: PSYCHIATRY & NEUROLOGY

## 2023-06-27 ASSESSMENT — PAIN SCALES - GENERAL: PAINLEVEL: NO PAIN (0)

## 2023-06-27 NOTE — LETTER
"6/27/2023       RE: Rohan Patterson  [address] [place] [address]       Dear Colleague,    Thank you for referring your patient, Rohan Patterson, to the [facility] MULTIPLE SCLEROSIS CLINIC [place] at [facility]. Please see a copy of my visit note below.    Date of Service: 6/27/2023    [facility] Neurology   MS Clinic Follow-up     Subjective: 33-year-old otherwise healthy man who presents in follow-up for multiple sclerosis.    He does not report any new symptoms concerning for an MS relapse.    He continues to take Gilenya, but was switched to generic in January.    His only concern is that he feels that he is getting sick more often.  He estimates having a viral illness every 6 to 8 weeks.  In recollection he has had 3 viral illnesses in the past 4 months.  These are typically upper respiratory illnesses manifesting with a low-grade fever and congestion.  Symptoms have been manageable, but he does notice the increase in frequency.    D3 50k/wk    Disease onset; R ON, 1/2020, age 29  Last relapse: \"    DMD hx:   Glatiramer acetate 40 mg 2/2020 - 9/2020, radiologic progression  Gilenya 10/14/20 - 12/2022  fingolimod 1/2023 - present (insurance requirement)       No Known Allergies    Current Outpatient Medications   Medication    fingolimod (GILENYA) 0.5 MG capsule    vitamin D3 (CHOLECALCIFEROL) 1.25 MG (37982 UT) capsule    acetaminophen (TYLENOL) 500 MG tablet    ibuprofen (ADVIL/MOTRIN) 200 MG capsule     No current facility-administered medications for this visit.        Past medical, surgical, social and family history was personally reviewed. Pertinent details noted above.     Physical Examination:   BP (!) 146/97 (BP Location: Left arm, Patient Position: Sitting, Cuff Size: Adult Regular)   Pulse 70   Wt 102.4 kg (225 lb 11.2 oz)   SpO2 100%   BMI 28.98 kg/m      General: no acute distress  Cranial nerves:   VFFC  PERRL w/no " RAPD  EOM full w/no NABIL   Face symmetric  Hearing intact  No dysarthria   Motor:   Tone is normal   Bulk is normal     R L  Deltoid  5 5  Biceps  5 5  Triceps 5 5  Wrist ext 5 5  Finger ext 5 5  Finger abd 5 5    Hip flexion 5 5  Knee flexion 5 5  Knee ext 5 5  Ankle d/f 5 5    Reflexes: 2+ and symmetric throughout, babinski absent bilaterally  Sensory: vibration is minimally reduced in the toes, JPS normal in the toes   Romberg is absent  Coordination: no ataxia or dysmetria  Gait: normal base and stride, tandem gait is intact, able to balance on one foot and hop x 5 bilaterally    Tests/Imaging:   CSF   9 OCB  3 wbc  IgG index normal     MOG neg  AQP4 neg    NESSA virus Ab n/t  Vitamin D 59    Abs lymph 500     MRI brain   1/2020 - several periventricular demyelinating lesions, 1 gd+; longitudinally extensive enhancement of the right optic nerve  7/2020 - 2 gd+ lesions  4/2021 - no new lesions, gd-   6/2022 - no new lesions, gd-   5/2023- no new lesions, gd-     MRI cervical spine   1/2020 - no demyelinating lesions  6/2022 - no definite lesions,2 spots but favor artifact b/c only noted on one view  5/2023 - no new lesions,gd-    MRI thoracic spine   1/2020 - no demyelinating lesions   6/2022 - no lesions    Assessment: 33-year-old man with relapsing remitting multiple sclerosis who remains clinically and radiologically stable on fingolimod.    He is experiencing frequent viral illnesses.  Recommended that he monitor this.  If this persists, then it may be reasonable to switch treatments.  We preliminarily discussed options including Aubagio, Tysabri, or an anti-CD20 antibody.  He is content with current continuing his current treatment    Plan:   -Monitoring blood work in 6 months  - Continue fingolimod  - Follow-up in 6 months    Note was completed with the assistance of Dragon Fluency software which can often result in accidental word substitutions.     A total of 30 minutes on the date of service were spent in  the care of this patient.   Elizabeth Man MD on 6/27/2023 at 8:22 AM                Again, thank you for allowing me to participate in the care of your patient.      Sincerely,    Elizabeth Man MD

## 2023-06-27 NOTE — PROGRESS NOTES
"Date of Service: 6/27/2023    [facility] Neurology   MS Clinic Follow-up     Subjective: 33-year-old otherwise healthy man who presents in follow-up for multiple sclerosis.    He does not report any new symptoms concerning for an MS relapse.    He continues to take Gilenya, but was switched to generic in January.    His only concern is that he feels that he is getting sick more often.  He estimates having a viral illness every 6 to 8 weeks.  In recollection he has had 3 viral illnesses in the past 4 months.  These are typically upper respiratory illnesses manifesting with a low-grade fever and congestion.  Symptoms have been manageable, but he does notice the increase in frequency.    D3 50k/wk    Disease onset; R ON, 1/2020, age 29  Last relapse: \"    DMD hx:   Glatiramer acetate 40 mg 2/2020 - 9/2020, radiologic progression  Gilenya 10/14/20 - 12/2022  fingolimod 1/2023 - present (insurance requirement)       No Known Allergies    Current Outpatient Medications   Medication     fingolimod (GILENYA) 0.5 MG capsule     vitamin D3 (CHOLECALCIFEROL) 1.25 MG (41109 UT) capsule     acetaminophen (TYLENOL) 500 MG tablet     ibuprofen (ADVIL/MOTRIN) 200 MG capsule     No current facility-administered medications for this visit.        Past medical, surgical, social and family history was personally reviewed. Pertinent details noted above.     Physical Examination:   BP (!) 146/97 (BP Location: Left arm, Patient Position: Sitting, Cuff Size: Adult Regular)   Pulse 70   Wt 102.4 kg (225 lb 11.2 oz)   SpO2 100%   BMI 28.98 kg/m      General: no acute distress  Cranial nerves:   VFFC  PERRL w/no RAPD  EOM full w/no NABIL   Face symmetric  Hearing intact  No dysarthria   Motor:   Tone is normal   Bulk is normal     R L  Deltoid  5 5  Biceps  5 5  Triceps 5 5  Wrist ext 5 5  Finger ext 5 5  Finger abd 5 5    Hip flexion 5 5  Knee flexion 5 5  Knee ext 5 5  Ankle d/f 5 5    Reflexes: 2+ and symmetric throughout, babinski absent " bilaterally  Sensory: vibration is minimally reduced in the toes, JPS normal in the toes   Romberg is absent  Coordination: no ataxia or dysmetria  Gait: normal base and stride, tandem gait is intact, able to balance on one foot and hop x 5 bilaterally    Tests/Imaging:   CSF   9 OCB  3 wbc  IgG index normal     MOG neg  AQP4 neg    NESSA virus Ab n/t  Vitamin D 59    Abs lymph 500     MRI brain   1/2020 - several periventricular demyelinating lesions, 1 gd+; longitudinally extensive enhancement of the right optic nerve  7/2020 - 2 gd+ lesions  4/2021 - no new lesions, gd-   6/2022 - no new lesions, gd-   5/2023- no new lesions, gd-     MRI cervical spine   1/2020 - no demyelinating lesions  6/2022 - no definite lesions,2 spots but favor artifact b/c only noted on one view  5/2023 - no new lesions,gd-    MRI thoracic spine   1/2020 - no demyelinating lesions   6/2022 - no lesions    Assessment: 33-year-old man with relapsing remitting multiple sclerosis who remains clinically and radiologically stable on fingolimod.    He is experiencing frequent viral illnesses.  Recommended that he monitor this.  If this persists, then it may be reasonable to switch treatments.  We preliminarily discussed options including Aubagio, Tysabri, or an anti-CD20 antibody.  He is content with current continuing his current treatment    Plan:   -Monitoring blood work in 6 months  - Continue fingolimod  - Follow-up in 6 months    Note was completed with the assistance of Dragon Fluency software which can often result in accidental word substitutions.     A total of 30 minutes on the date of service were spent in the care of this patient.   Elizabeth Man MD on 6/27/2023 at 8:22 AM

## 2023-06-27 NOTE — NURSING NOTE
Chief Complaint   Patient presents with     MS     RECHECK     Ms follow up      Vitals were taken and medications were reconciled.   Patric Finch, EMT  8:19 AM

## 2023-06-27 NOTE — PATIENT INSTRUCTIONS
Continue fingolimod     Blood work in the beginning of December    Monitor frequency of viral infections   We discussed a few other treatment options if viral illnesses continue to be a challenge: aubagio, tysabri, or ocrevus/kesimpta     Follow up in 6 months

## 2023-10-05 ENCOUNTER — TELEPHONE (OUTPATIENT)
Dept: NEUROLOGY | Facility: CLINIC | Age: 33
End: 2023-10-05
Payer: COMMERCIAL

## 2023-10-05 NOTE — TELEPHONE ENCOUNTER
PA Initiation    Medication: FINGOLIMOD HCL 0.5 MG PO CAPS  Insurance Company: [employer] - Phone [phone] Fax [phone]  Pharmacy Filling the Rx: [facility] - [place], [place] - [address]  Filling Pharmacy Phone:    Filling Pharmacy Fax:    Start Date: 10/5/2023        Thank you,    Margaret Fonseca [facility]-T  Specialty Pharmacy Clinic Liaison - CardiologyNeurologyMultiple Sclerosis  [facility] [address]  [address] [place] [address]  Ph: [phone] Fax: [phone]  [email or web address]

## 2023-10-05 NOTE — TELEPHONE ENCOUNTER
PA Needed    Medication: Fingolimod PA Renewal  QTY/DS: 30 per 30 days  NEW INS: no  Insurance Company:  [employer]  Pharmacy Filling the Rx:  [facility]  PA :  23  Date of last fill: 23

## 2023-10-06 NOTE — TELEPHONE ENCOUNTER
Prior Authorization Approval    Medication: FINGOLIMOD HCL 0.5 MG PO CAPS  Authorization Effective Date: 10/5/2023  Authorization Expiration Date: 10/4/2024  Approved Dose/Quantity: 30 days  Reference #:     Insurance Company: [employer] - Phone [phone] Fax [phone]  Expected CoPay: $    CoPay Card Available: No    Financial Assistance Needed: N/A  Which Pharmacy is filling the prescription: [facility] - [place], [place] - [address]  Pharmacy Notified: Yes  Patient Notified: Yes          Thank you,    Margaret Fonseca CPh-T  Specialty Pharmacy Clinic Liaison - CardiologyNeurologyMultiple Sclerosis  [facility] Surgery [address] [address]  Ph: [phone] Fax: [phone]  [email or web address]

## 2023-12-22 ENCOUNTER — LAB (OUTPATIENT)
Dept: LAB | Facility: CLINIC | Age: 33
End: 2023-12-22
Payer: COMMERCIAL

## 2023-12-22 DIAGNOSIS — G35 MS (MULTIPLE SCLEROSIS) (H): ICD-10-CM

## 2023-12-22 LAB
ALBUMIN SERPL BCG-MCNC: 4.7 G/DL (ref 3.5–5.2)
ALP SERPL-CCNC: 77 U/L (ref 40–150)
ALT SERPL W P-5'-P-CCNC: 48 U/L (ref 0–70)
AST SERPL W P-5'-P-CCNC: 29 U/L (ref 0–45)
BASOPHILS # BLD AUTO: 0 10E3/UL (ref 0–0.2)
BASOPHILS NFR BLD AUTO: 1 %
BILIRUB DIRECT SERPL-MCNC: <0.2 MG/DL (ref 0–0.3)
BILIRUB SERPL-MCNC: 0.6 MG/DL
EOSINOPHIL # BLD AUTO: 0 10E3/UL (ref 0–0.7)
EOSINOPHIL NFR BLD AUTO: 2 %
ERYTHROCYTE [DISTWIDTH] IN BLOOD BY AUTOMATED COUNT: 12.6 % (ref 10–15)
HCT VFR BLD AUTO: 45.6 % (ref 40–53)
HGB BLD-MCNC: 15.6 G/DL (ref 13.3–17.7)
IMM GRANULOCYTES # BLD: 0 10E3/UL
IMM GRANULOCYTES NFR BLD: 1 %
LYMPHOCYTES # BLD AUTO: 0.5 10E3/UL (ref 0.8–5.3)
LYMPHOCYTES NFR BLD AUTO: 22 %
MCH RBC QN AUTO: 29 PG (ref 26.5–33)
MCHC RBC AUTO-ENTMCNC: 34.2 G/DL (ref 31.5–36.5)
MCV RBC AUTO: 85 FL (ref 78–100)
MONOCYTES # BLD AUTO: 0.4 10E3/UL (ref 0–1.3)
MONOCYTES NFR BLD AUTO: 19 %
NEUTROPHILS # BLD AUTO: 1.3 10E3/UL (ref 1.6–8.3)
NEUTROPHILS NFR BLD AUTO: 56 %
PLATELET # BLD AUTO: 245 10E3/UL (ref 150–450)
PROT SERPL-MCNC: 7.3 G/DL (ref 6.4–8.3)
RBC # BLD AUTO: 5.38 10E6/UL (ref 4.4–5.9)
WBC # BLD AUTO: 2.2 10E3/UL (ref 4–11)

## 2023-12-22 PROCEDURE — 80076 HEPATIC FUNCTION PANEL: CPT

## 2023-12-22 PROCEDURE — 85025 COMPLETE CBC W/AUTO DIFF WBC: CPT

## 2023-12-22 PROCEDURE — 36415 COLL VENOUS BLD VENIPUNCTURE: CPT

## 2023-12-27 ENCOUNTER — OFFICE VISIT (OUTPATIENT)
Dept: NEUROLOGY | Facility: CLINIC | Age: 33
End: 2023-12-27
Attending: PSYCHIATRY & NEUROLOGY
Payer: COMMERCIAL

## 2023-12-27 VITALS
SYSTOLIC BLOOD PRESSURE: 139 MMHG | BODY MASS INDEX: 29.06 KG/M2 | HEART RATE: 82 BPM | WEIGHT: 226.3 LBS | DIASTOLIC BLOOD PRESSURE: 93 MMHG | OXYGEN SATURATION: 99 %

## 2023-12-27 DIAGNOSIS — G35 MS (MULTIPLE SCLEROSIS) (H): Primary | ICD-10-CM

## 2023-12-27 PROCEDURE — 99214 OFFICE O/P EST MOD 30 MIN: CPT | Performed by: PSYCHIATRY & NEUROLOGY

## 2023-12-27 RX ORDER — FINGOLIMOD HYDROCHLORIDE 0.5 MG/1
0.5 CAPSULE ORAL DAILY
Qty: 30 CAPSULE | Refills: 11 | Status: SHIPPED | OUTPATIENT
Start: 2023-12-27

## 2023-12-27 ASSESSMENT — PAIN SCALES - GENERAL: PAINLEVEL: NO PAIN (0)

## 2023-12-27 NOTE — PATIENT INSTRUCTIONS
Continue fingolimod     Blood work in 6 months     Mri in 1 year   (Brain on 7 melvin)     Follow up after MRI

## 2023-12-27 NOTE — LETTER
"12/27/2023       RE: Rohan Patterson  [address] [place] [address]     Dear Colleague,    Thank you for referring your patient, Rohan Patterson, to the [facility]ULT[facility]LE [facility]OS[facility] [place] [facility]. Please see a copy of my visit note below.    Date of Service: 12/27/2023    [facility] Neurology   MS Clinic Follow-up     Subjective: 33-year-old otherwise healthy man who presents in follow-up for multiple sclerosis.    He does not report any discrete new symptoms today.  He has been training for the [employer].  He is able to do a 20 to 30 mile bike ride and does not experience any numbness or tingling.  Does not notice any weakness after getting off.  If he does a particularly high intense workout he will experience some numbness in the feet.    Recall that he has been taking fingolimod.  At his last visit he was struggling with recurrent viral illnesses, but he has not had any major illnesses in the past 6 months.    He denies any change in vision, balance, or bladder control.    D3 50k/wk    Disease onset; R ON, 1/2020, age 29  Last relapse: \"    DMD hx:   Glatiramer acetate 40 mg 2/2020 - 9/2020, radiologic progression  Gilenya 10/14/20 - 12/2022  fingolimod 1/2023 - present (insurance requirement)       No Known Allergies    Current Outpatient Medications   Medication    fingolimod (GILENYA) 0.5 MG capsule    vitamin D3 (CHOLECALCIFEROL) 1.25 MG (85606 UT) capsule    acetaminophen (TYLENOL) 500 MG tablet    ibuprofen (ADVIL/MOTRIN) 200 MG capsule     No current facility-administered medications for this visit.        Past medical, surgical, social and family history was personally reviewed. Pertinent details noted above.     Physical Examination:   BP (!) 139/93 (BP Location: Left arm, Patient Position: Sitting, Cuff Size: Adult Regular)   Pulse 82   Wt 102.6 kg (226 lb 4.8 oz)   SpO2 99%   BMI 29.06 kg/m      General: no acute " distress  Cranial nerves:   VFFC  PERRL w/no RAPD  EOM full w/no NABIL   Face symmetric  Hearing intact  No dysarthria   Motor:   Tone is normal   Bulk is normal     R L  Deltoid  5 5  Biceps  5 5  Triceps 5 5  Wrist ext 5 5  Finger ext 5 5  Finger abd 5 5    Hip flexion 5 5  Knee flexion 5 5  Knee ext 5 5  Ankle d/f 5 5    Reflexes: 2+ and symmetric throughout, babinski absent bilaterally  Sensory: vibration is minimally reduced in the toes, JPS normal in the toes   Romberg is absent  Coordination: no ataxia or dysmetria  Gait: normal base and stride, tandem gait is intact, able to balance on one foot and hop x 5 bilaterally, able to get up from chair with single-leg but a bit more unsteady with left compared to right    Tests/Imaging:   CSF   9 OCB  3 wbc  IgG index normal     MOG neg  AQP4 neg    NESSA virus Ab n/t  Vitamin D 59    Abs lymph 500   ANC 1300    MRI brain   1/2020 - several periventricular demyelinating lesions, 1 gd+; longitudinally extensive enhancement of the right optic nerve  7/2020 - 2 gd+ lesions  4/2021 - no new lesions, gd-   6/2022 - no new lesions, gd-   5/2023- no new lesions, gd-     MRI cervical spine   1/2020 - no demyelinating lesions  6/2022 - no definite lesions,2 spots but favor artifact b/c only noted on one view  5/2023 - no new lesions,gd-    MRI thoracic spine   1/2020 - no demyelinating lesions   6/2022 - no lesions    Assessment: 33-year-old man with relapsing remitting multiple sclerosis who remains clinically and radiologically stable on fingolimod.    He does have mild neutropenia secondary to fingolimod.  This may be increasing his risk for infections, but infections have not been problematic in the past 6 months.  I have therefore recommended that he continue with fingolimod, but plan to have blood work done in 6 months to ensure no progression of the neutropenia.  If infections become problematic again, then we would consider switching treatments to either Tysabri or  Ocrevus.    I recommended that he proceed with radiologic surveillance in 1 year.  He should follow-up with me after the MRI.    Plan:   - CBC, hepatic panel every 6 months  - Continue fingolimod  - MRI in 1 year  - Follow-up after MRI    Note was completed with the assistance of Dragon Fluency software which can often result in accidental word substitutions.     A total of 30 minutes on the date of service were spent in the care of this patient.   Elizabeth Man MD on 12/27/2023 at 10:02 AM            Again, thank you for allowing me to participate in the care of your patient.      Sincerely,    Elizabeth Man MD

## 2023-12-27 NOTE — PROGRESS NOTES
"Date of Service: 12/27/2023    [facility] Neurology   MS Clinic Follow-up     Subjective: 33-year-old otherwise healthy man who presents in follow-up for multiple sclerosis.    He does not report any discrete new symptoms today.  He has been training for the .  He is able to do a 20 to 30 mile bike ride and does not experience any numbness or tingling.  Does not notice any weakness after getting off.  If he does a particularly high intense workout he will experience some numbness in the feet.    Recall that he has been taking fingolimod.  At his last visit he was struggling with recurrent viral illnesses, but he has not had any major illnesses in the past 6 months.    He denies any change in vision, balance, or bladder control.    D3 50k/wk    Disease onset; R ON, 1/2020, age 29  Last relapse: \"    DMD hx:   Glatiramer acetate 40 mg 2/2020 - 9/2020, radiologic progression  Gilenya 10/14/20 - 12/2022  fingolimod 1/2023 - present (insurance requirement)       No Known Allergies    Current Outpatient Medications   Medication    fingolimod (GILENYA) 0.5 MG capsule    vitamin D3 (CHOLECALCIFEROL) 1.25 MG (95675 UT) capsule    acetaminophen (TYLENOL) 500 MG tablet    ibuprofen (ADVIL/MOTRIN) 200 MG capsule     No current facility-administered medications for this visit.        Past medical, surgical, social and family history was personally reviewed. Pertinent details noted above.     Physical Examination:   BP (!) 139/93 (BP Location: Left arm, Patient Position: Sitting, Cuff Size: Adult Regular)   Pulse 82   Wt 102.6 kg (226 lb 4.8 oz)   SpO2 99%   BMI 29.06 kg/m      General: no acute distress  Cranial nerves:   VFFC  PERRL w/no RAPD  EOM full w/no NABIL   Face symmetric  Hearing intact  No dysarthria   Motor:   Tone is normal   Bulk is normal     R L  Deltoid  5 5  Biceps  5 5  Triceps 5 5  Wrist ext 5 5  Finger ext 5 5  Finger abd 5 5    Hip flexion 5 5  Knee flexion 5 5  Knee ext 5 5  Ankle " d/f 5 5    Reflexes: 2+ and symmetric throughout, babinski absent bilaterally  Sensory: vibration is minimally reduced in the toes, JPS normal in the toes   Romberg is absent  Coordination: no ataxia or dysmetria  Gait: normal base and stride, tandem gait is intact, able to balance on one foot and hop x 5 bilaterally, able to get up from chair with single-leg but a bit more unsteady with left compared to right    Tests/Imaging:   CSF   9 OCB  3 wbc  IgG index normal     MOG neg  AQP4 neg    NESSA virus Ab n/t  Vitamin D 59    Abs lymph 500   ANC 1300    MRI brain   1/2020 - several periventricular demyelinating lesions, 1 gd+; longitudinally extensive enhancement of the right optic nerve  7/2020 - 2 gd+ lesions  4/2021 - no new lesions, gd-   6/2022 - no new lesions, gd-   5/2023- no new lesions, gd-     MRI cervical spine   1/2020 - no demyelinating lesions  6/2022 - no definite lesions,2 spots but favor artifact b/c only noted on one view  5/2023 - no new lesions,gd-    MRI thoracic spine   1/2020 - no demyelinating lesions   6/2022 - no lesions    Assessment: 33-year-old man with relapsing remitting multiple sclerosis who remains clinically and radiologically stable on fingolimod.    He does have mild neutropenia secondary to fingolimod.  This may be increasing his risk for infections, but infections have not been problematic in the past 6 months.  I have therefore recommended that he continue with fingolimod, but plan to have blood work done in 6 months to ensure no progression of the neutropenia.  If infections become problematic again, then we would consider switching treatments to either Tysabri or Ocrevus.    I recommended that he proceed with radiologic surveillance in 1 year.  He should follow-up with me after the MRI.    Plan:   - CBC, hepatic panel every 6 months  - Continue fingolimod  - MRI in 1 year  - Follow-up after MRI    Note was completed with the assistance of Dragon Fluency software which can  often result in accidental word substitutions.     A total of 30 minutes on the date of service were spent in the care of this patient.   Elizabeth Man MD on 12/27/2023 at 10:02 AM

## 2023-12-27 NOTE — NURSING NOTE
Chief Complaint   Patient presents with    MS    RECHECK     6 month follow up      Vitals were taken and medications were reconciled.   Patric iFnch, EMT  9:55 AM

## 2023-12-27 NOTE — PROGRESS NOTES
"Date of Service: 12/27/2023     [facility] Neurology   MS Clinic Follow-up      Interval Hx: 33-year-old otherwise healthy man who presents in follow-up for relapsing remitting multiple sclerosis.    The patient was last seen on 6/27/2023. Since that time, he denies any new symptoms concerning for an MS relapse and otherwise suffers no residual symptoms from past flares.     He continues on Gilenya and was switched to generic in Jan 2023     His only concern is that he feels that he is getting sick more often.  He estimates having a viral illness every 6 to 8 weeks.  In recollection he has had 3 viral illnesses in the past 4 months.  These are typically upper respiratory illnesses manifesting with a low-grade fever and congestion.  Symptoms have been manageable, but he does notice the increase in frequency.     D3 50k/wk     HPI:  Disease onset; R ON, 1/2020, age 29  Last relapse: \"    DMD hx:   Glatiramer acetate 40 mg 2/2020 - 9/2020, radiologic progression  Gilenya 10/14/20 - 12/2022  Fingolimod 1/2023 - present (insurance requirement)         No Known Allergies     Current Outpatient Medications   Medication    acetaminophen (TYLENOL) 500 MG tablet    fingolimod (GILENYA) 0.5 MG capsule    ibuprofen (ADVIL/MOTRIN) 200 MG capsule    vitamin D3 (CHOLECALCIFEROL) 1.25 MG (52132 UT) capsule     No current facility-administered medications for this visit.      Past medical, surgical, social and family history was personally reviewed. Pertinent details noted above.      Physical Examination:   There were no vitals taken for this visit.     General: no acute distress  Cranial nerves:   VFFC  PERRL w/no RAPD  EOM full w/no NABIL   Face symmetric  Hearing intact  No dysarthria   Motor:   Tone is normal   Bulk is normal                           R          L  Deltoid             5          5  Biceps             5          5  Triceps            5          5  Wrist ext          5          5  Finger ext        5          " 5  Finger abd       5          5     Hip flexion       5          5  Knee flexion    5          5  Knee ext          5          5  Ankle d/f          5          5     Reflexes: 2+ and symmetric throughout, babinski absent bilaterally  Sensory: vibration is minimally reduced in the toes, JPS normal in the toes   Romberg is absent  Coordination: no ataxia or dysmetria  Gait: normal base and stride, tandem gait is intact, able to balance on one foot and hop x 5 bilaterally     Tests/Imaging:   CSF   9 OCB  3 wbc  IgG index normal     MOG neg  AQP4 neg    NESSA virus Ab n/t  Vitamin D 59     Abs lymph 500     MRI brain   1/2020 - several periventricular demyelinating lesions, 1 gd+; longitudinally extensive enhancement of the right optic nerve  7/2020 - 2 gd+ lesions  4/2021 - no new lesions, gd-   6/2022 - no new lesions, gd-   5/2023- no new lesions, gd-     MRI cervical spine   1/2020 - no demyelinating lesions  6/2022 - no definite lesions,2 spots but favor artifact b/c only noted on one view  5/2023 - no new lesions,gd-    MRI thoracic spine   1/2020 - no demyelinating lesions   6/2022 - no lesions     Assessment: 33-year-old man with relapsing remitting multiple sclerosis who remains clinically and radiologically stable on fingolimod.     He is experiencing frequent viral illnesses.  Recommended that he monitor this.  If this persists, then it may be reasonable to switch treatments.  We preliminarily discussed options including Aubagio, Tysabri, or an anti-CD20 antibody.  He is content with current continuing his current treatment     Plan:   - Monitoring blood work in 6 months  - Continue fingolimod  - Follow-up in 6 months

## 2024-01-02 LAB — SCANNED LAB RESULT: ABNORMAL

## 2024-05-12 ENCOUNTER — HOSPITAL ENCOUNTER (EMERGENCY)
Facility: HOSPITAL | Age: 34
Discharge: HOME | End: 2024-05-12
Admitting: PHYSICIAN ASSISTANT
Payer: COMMERCIAL

## 2024-05-12 ENCOUNTER — HOSPITAL ENCOUNTER (OUTPATIENT)
Dept: GENERAL RADIOLOGY | Facility: HOSPITAL | Age: 34
Discharge: HOME | End: 2024-05-12
Attending: NURSE PRACTITIONER | Admitting: NURSE PRACTITIONER
Payer: COMMERCIAL

## 2024-05-12 ENCOUNTER — OFFICE VISIT (OUTPATIENT)
Dept: FAMILY MEDICINE | Facility: CLINIC | Age: 34
End: 2024-05-12
Payer: COMMERCIAL

## 2024-05-12 ENCOUNTER — NURSE TRIAGE (OUTPATIENT)
Dept: NURSING | Facility: CLINIC | Age: 34
End: 2024-05-12

## 2024-05-12 VITALS
SYSTOLIC BLOOD PRESSURE: 148 MMHG | OXYGEN SATURATION: 99 % | BODY MASS INDEX: 30.13 KG/M2 | DIASTOLIC BLOOD PRESSURE: 103 MMHG | TEMPERATURE: 98.2 F | HEART RATE: 74 BPM | RESPIRATION RATE: 18 BRPM | WEIGHT: 234.7 LBS

## 2024-05-12 VITALS
HEART RATE: 78 BPM | HEIGHT: 74 IN | OXYGEN SATURATION: 98 % | BODY MASS INDEX: 30.03 KG/M2 | RESPIRATION RATE: 12 BRPM | TEMPERATURE: 97.4 F | WEIGHT: 234 LBS | SYSTOLIC BLOOD PRESSURE: 156 MMHG | DIASTOLIC BLOOD PRESSURE: 92 MMHG

## 2024-05-12 DIAGNOSIS — M79.89 PAIN AND SWELLING OF TOE, RIGHT: Primary | ICD-10-CM

## 2024-05-12 DIAGNOSIS — M79.674 GREAT TOE PAIN, RIGHT: ICD-10-CM

## 2024-05-12 DIAGNOSIS — M79.674 PAIN AND SWELLING OF TOE, RIGHT: ICD-10-CM

## 2024-05-12 DIAGNOSIS — M79.89 PAIN AND SWELLING OF TOE, RIGHT: ICD-10-CM

## 2024-05-12 DIAGNOSIS — M79.674 PAIN AND SWELLING OF TOE, RIGHT: Primary | ICD-10-CM

## 2024-05-12 PROBLEM — G35 MS (MULTIPLE SCLEROSIS) (H): Status: ACTIVE | Noted: 2020-02-10

## 2024-05-12 LAB — URATE SERPL-MCNC: 6.3 MG/DL (ref 3.4–7)

## 2024-05-12 PROCEDURE — 73630 X-RAY EXAM OF FOOT: CPT | Mod: RT

## 2024-05-12 PROCEDURE — 99282 EMERGENCY DEPT VISIT SF MDM: CPT

## 2024-05-12 PROCEDURE — 99213 OFFICE O/P EST LOW 20 MIN: CPT | Performed by: NURSE PRACTITIONER

## 2024-05-12 PROCEDURE — 84550 ASSAY OF BLOOD/URIC ACID: CPT | Performed by: NURSE PRACTITIONER

## 2024-05-12 PROCEDURE — 36415 COLL VENOUS BLD VENIPUNCTURE: CPT | Performed by: NURSE PRACTITIONER

## 2024-05-12 RX ORDER — PREDNISONE 20 MG/1
40 TABLET ORAL DAILY
Qty: 10 TABLET | Refills: 0 | Status: SHIPPED | OUTPATIENT
Start: 2024-05-12 | End: 2024-05-17

## 2024-05-12 ASSESSMENT — COLUMBIA-SUICIDE SEVERITY RATING SCALE - C-SSRS
1. IN THE PAST MONTH, HAVE YOU WISHED YOU WERE DEAD OR WISHED YOU COULD GO TO SLEEP AND NOT WAKE UP?: NO
2. HAVE YOU ACTUALLY HAD ANY THOUGHTS OF KILLING YOURSELF IN THE PAST MONTH?: NO
6. HAVE YOU EVER DONE ANYTHING, STARTED TO DO ANYTHING, OR PREPARED TO DO ANYTHING TO END YOUR LIFE?: NO

## 2024-05-12 NOTE — ED PROVIDER NOTES
EMERGENCY DEPARTMENT ENCOUNTER      NAME: Rohan Patterson  AGE: 34 year old male  YOB: 1990  MRN: [id number]  EVALUATION DATE & TIME: 5/12/2024  3:16 PM    PCP: No Ref-Primary, Physician    ED PROVIDER: Antolin Calle PA-C      Chief Complaint   Patient presents with    Foot Pain     FINAL IMPRESSION:  1. Great toe pain, right      ED COURSE & MEDICAL DECISION MAKING:    Pertinent Labs & Imaging studies reviewed. (See chart for details)  3:19 PM I met the patient and performed my initial interview and exam. Discussed plan for discharge.     34 year old male presents to the Emergency Department for evaluation of right foot pain.     ED Course as of 05/12/24 1531   Sun May 12, 2024   1529 Patient is a 34-year-old male, presents emergency department for evaluation of right-sided great toe pain.  Patient reports that the pain is isolated to the right great toe, warm, painful, went to urgent care, had a negative x-ray, negative uric acid, thought this may be a gout flare as he has previously had episodes like this, never a formal diagnosis of gout.  X-ray at urgent care reviewed, negative for acute fractures.  Patient with no posterior calf pain, tenderness, or other acute symptoms.  Certainly no evidence of acute abnormalities on my examination here other than a little bit of redness around the right great toe.  No surrounding redness, swelling.  Certainly inconsistent with skin infection here.  Does feel warm to the touch.  Presentation may be consistent with gout without formal diagnosis of gout, certainly does not seem consistent with venous abnormality, patient has good pulses, does not have any posterior calf tenderness, and no other acute symptoms.  Recommend close observation, starting a prednisone, and follow-up with outpatient doctors.  No indication for any further labs or imaging here.     Medical Decision Making  Obtained supplemental history:Supplemental history obtained?: No  Reviewed  "external records: External records reviewed?: Outpatient Record: Outpatient office visit today, outpatient x-ray, outpatient nurse triage line call  Care impacted by chronic illness:Other: Multiple sclerosis   Care significantly affected by social determinants of health:N/A  Did you consider but not order tests?: Work up considered but not performed and documented in chart, if applicable  Did you interpret images independently?: Independent interpretation of ECG and images noted in documentation, when applicable.  Consultation discussion with other provider:Did you involve another provider (consultant, , pharmacy, etc.)?: No  Discharge. No recommendations on prescription strength medication(s). N/A.         At the conclusion of the encounter I discussed the results of all of the tests and the disposition. The questions were answered. The patient or family acknowledged understanding and was agreeable with the care plan.       0 minutes of critical care time     MEDICATIONS GIVEN IN THE EMERGENCY:  Medications - No data to display    NEW PRESCRIPTIONS STARTED AT TODAY'S ER VISIT  New Prescriptions    No medications on file          =================================================================    HPI    Patient information was obtained from: Patient     Use of : N/A        Rohan Patterson is a 34 year old male with a pertinent history of MS, previous infection who presents to this ED for evaluation of right-sided foot and toe pain for 10 days.  Patient reportedly very tender to the touch, red.  Went to urgent care, was told that he has gout, and sent on short course of prednisone.  Recently returned from [place], concerned about DVT, though has no posterior calf pain, swelling, redness, or other symptoms.  Did do a lot of walking vacation, and reports that he did eat some of the foods that have typically flared up his \"joints\" in the past.    PAST MEDICAL HISTORY:  No past medical history on file.    PAST " "SURGICAL HISTORY:  Past Surgical History:   Procedure Laterality Date    IR LUMBAR PUNCTURE  1/27/2020           CURRENT MEDICATIONS:    acetaminophen (TYLENOL) 500 MG tablet  fingolimod (GILENYA) 0.5 MG capsule  ibuprofen (ADVIL/MOTRIN) 200 MG capsule  predniSONE (DELTASONE) 20 MG tablet  vitamin D3 (CHOLECALCIFEROL) 1.25 MG (26912 UT) capsule         ALLERGIES:  No Known Allergies    FAMILY HISTORY:  No family history on file.    SOCIAL HISTORY:   Social History     Socioeconomic History    Marital status:    Tobacco Use    Smoking status: Never    Smokeless tobacco: Never   Substance and Sexual Activity    Alcohol use: Not Currently    Drug use: Not Currently       VITALS:  BP (!) 201/127   Pulse 88   Temp 97.4  F (36.3  C) (Tympanic)   Resp 12   Ht 1.88 m (6' 2\")   Wt 106.1 kg (234 lb)   SpO2 97%   BMI 30.04 kg/m      PHYSICAL EXAM    Physical Exam  Vitals and nursing note reviewed.   Constitutional:       General: He is not in acute distress.     Appearance: Normal appearance. He is normal weight. He is not toxic-appearing or diaphoretic.   HENT:      Right Ear: External ear normal.      Left Ear: External ear normal.   Eyes:      Conjunctiva/sclera: Conjunctivae normal.   Cardiovascular:      Pulses: Normal pulses.   Pulmonary:      Effort: Pulmonary effort is normal.   Abdominal:      General: Abdomen is flat.      Palpations: Abdomen is soft.   Musculoskeletal:         General: Swelling and tenderness present. No deformity or signs of injury.      Comments: Tenderness over the base of the right great toe, with no significant surrounding erythema or redness.  Range of motion normal.  No crepitus or deformity.  No posterior calf tenderness, swelling, redness, or erythema.   Skin:     Findings: Erythema present. No rash.   Neurological:      Mental Status: He is alert. Mental status is at baseline.           LAB:  All pertinent labs reviewed and interpreted.  Labs Ordered and Resulted from Time of " ED Arrival to Time of ED Departure - No data to display    RADIOLOGY:  Reviewed all pertinent imaging. Please see official radiology report.  No orders to display     PROCEDURES:   None.     Antolin Calle PA-C  Emergency Medicine  [facility] EMERGENCY DEPARTMENT  [address] [place] [id number]  [phone]  Dept: [phone]       Antolin Calle PA-C  05/12/24 1532

## 2024-05-12 NOTE — TELEPHONE ENCOUNTER
"The patient is calling and reports he was seen in  today on 05/12/24  He says he was prescribed prednisone for possible gout attack in his right great toe area    He reports he does not think he has gout and that his lab work was negative for uric acid level indicated a diagnosis of gout.    His wife joined in on the conversation and says that they are concerned about a possible blood clot in the right great toe area.    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    Triager reintegrated provider notes of red flag symptoms and the recommended follow up treatment plan    Triager discussed with the patient that the prednisone will help improved the inflammation and swelling he is experiencing in the right great toe.    Reason for Disposition   General information question, no triage required and triager able to answer question    Additional Information   Negative: [1] Caller is not with the adult (patient) AND [2] reporting urgent symptoms   Negative: Lab result questions   Negative: Medication questions   Negative: Caller can't be reached by phone   Negative: Caller has already spoken to PCP or another triager   Negative: RN needs further essential information from caller in order to complete triage   Negative: Requesting regular office appointment   Negative: [1] Caller requesting NON-URGENT health information AND [2] PCP's office is the best resource   Negative: Health Information question, no triage required and triager able to answer question    Protocols used: Information Only Call - No Triage-A-    "

## 2024-05-12 NOTE — DISCHARGE INSTRUCTIONS
You were seen here in the emergency department for evaluation of right-sided great toe pain.  I would continue the steroids that you were sent home on from urgent care, additionally continue to take ibuprofen or Tylenol.  Follow-up with your primary care doctor if you have worsening symptoms    For pain or fever you may use:  -Tylenol 650 mg every 6 hours.  Max 4000 mg in 24 hours  Do not use thismedication with alcohol as it can cause liver problems.  -Ibuprofen 600 mg every 6 hours.  Max 3500 mg in 24 hours  Do not take this medication if you have a history of a GI bleed or have kidney problems.  You may use both of these medications at the same time or you can alternate them every 3 hours.  For example, Tylenol at 6 AM, ibuprofen at 9 AM, Tylenol at noon, etc.

## 2024-05-12 NOTE — PROGRESS NOTES
Patient presents with:  Musculoskeletal Problem: 9 days Right foot pain and swollen.      Clinical Decision Making: Focused exam positive for significant swelling, tenderness and warmth of her right great toe extending into metatarsal aspect.  Strength 5/5, pulse 2+.  FROM.  Right foot x-ray with negative fracture.    Clinical presentation and medical decision making consistent with possible gout flare versus other acute soft tissue swelling/strain, pending uric acid level.  Will treat with a prednisone burst for 5 days.  Encouraged RICE measures, with elevation is much as feasible and 20-minute intervals of ice application.      Encouraged patient to monitor blood pressure outside of clinic with documentation, and is encouraged to follow-up with primary care provider in the next 1 to 2 weeks for reevaluation as discussed.    Reviewed red flag symptoms and when to return for reevaluation, education provided.      ICD-10-CM    1. Pain and swelling of toe, right  M79.674 XR Foot Right G/E 3 Views    M79.89 Uric acid     predniSONE (DELTASONE) 20 MG tablet     Uric acid          There are no Patient Instructions on file for this visit.    HPI: oRhan Patterson is a 34 year old male with multiple sclerosis history, who presents today complaining of right foot pain with significant swelling and tenderness that began in his ankle for the past 9 -10 days; now has moved to great toe for the past 2 to 3 days with worsening pain and swelling.  Patient endorses pain is a 7/10 on the numeric pain scale. Reports no OTC medications taken.  Endorses difficulty sleeping and mobilizing due to swelling/pain of great toe.  Endorses strong family history of gout, no previous diagnosis however has identified multiple triggers associated with alcohol/rich foods. Endorses recent travel to [place] with increased alcohol, mild dehydration with excessive walking as well as rich foods. Denies any systemic myalgia, fevers or chills. Denies any  numbness or tingling.    Of note, blood pressure elevated, patient denies any chest pain or heart palpitations.  Denies any shortness of breath or diaphoresis.    History obtained from the patient.    Problem List:  There are no relevant problems documented for this patient.      History reviewed. No pertinent past medical history.    Social History     Tobacco Use    Smoking status: Never    Smokeless tobacco: Never   Substance Use Topics    Alcohol use: Not Currently       Review of Systems  As noted in HPI    Vitals:    05/12/24 0946 05/12/24 0948   BP: (!) 166/106 (!) 148/103   BP Location: Left arm    Patient Position: Sitting    Cuff Size: Adult Regular    Pulse: 74    Resp: 18    Temp: 98.2  F (36.8  C)    TempSrc: Oral    SpO2: 99%    Weight: 106.5 kg (234 lb 11.2 oz)        Physical Exam  Constitutional:       Appearance: Normal appearance.   Cardiovascular:      Rate and Rhythm: Normal rate and regular rhythm.      Pulses: Normal pulses.      Heart sounds: Normal heart sounds.   Pulmonary:      Effort: Pulmonary effort is normal.      Breath sounds: Normal breath sounds.   Musculoskeletal:         General: Swelling and tenderness present.      Comments: RIGHT foot, with nontender ankle, no swelling, erythema or warmth. RIGHT Great toe with significant swelling, tenderness and warmth extending into metatarsal aspect.  Strength 5/5, pulse 2+.  FROM.     Skin:     General: Skin is warm.      Capillary Refill: Capillary refill takes less than 2 seconds.   Neurological:      Mental Status: He is alert and oriented to person, place, and time.      Sensory: No sensory deficit.      Motor: No weakness.      Gait: Gait abnormal.         Labs:  Results for orders placed or performed during the hospital encounter of 05/12/24   XR Foot Right G/E 3 Views     Status: None    Narrative    EXAM: XR FOOT RIGHT G/E 3 VIEWS  LOCATION: [facility]  DATE: 5/12/2024    INDICATION: Worsening swelling,  tenderness with warmth along the great toe over the past 2 to 3 days.  No known injury or fall.  Please evaluate for fracture or other acute process?  COMPARISON: None.      Impression    IMPRESSION:     Views of the right foot are negative for acute fracture or dislocation. There is nonspecific soft tissue swelling over the great toe. There is mild first MTP degenerative arthrosis. Pes cavus.     At the end of the encounter, I discussed results, diagnosis, medications. Discussed red flags for immediate return to clinic/ER, as well as indications for follow up if no improvement. Patient understood and agreed to plan.     ARLENE Porras CNP

## 2024-05-21 ENCOUNTER — OFFICE VISIT (OUTPATIENT)
Dept: FAMILY MEDICINE | Facility: CLINIC | Age: 34
End: 2024-05-21
Payer: COMMERCIAL

## 2024-05-21 VITALS
BODY MASS INDEX: 29.34 KG/M2 | HEIGHT: 75 IN | TEMPERATURE: 98.5 F | SYSTOLIC BLOOD PRESSURE: 173 MMHG | HEART RATE: 83 BPM | RESPIRATION RATE: 9 BRPM | OXYGEN SATURATION: 100 % | DIASTOLIC BLOOD PRESSURE: 102 MMHG | WEIGHT: 236 LBS

## 2024-05-21 DIAGNOSIS — R03.0 ELEVATED BLOOD PRESSURE READING IN OFFICE WITHOUT DIAGNOSIS OF HYPERTENSION: ICD-10-CM

## 2024-05-21 DIAGNOSIS — M79.89 TOE SWELLING: Primary | ICD-10-CM

## 2024-05-21 DIAGNOSIS — Z13.220 LIPID SCREENING: ICD-10-CM

## 2024-05-21 DIAGNOSIS — Z13.1 SCREENING FOR DIABETES MELLITUS: ICD-10-CM

## 2024-05-21 LAB — HBA1C MFR BLD: 5.6 % (ref 0–5.6)

## 2024-05-21 PROCEDURE — 80048 BASIC METABOLIC PNL TOTAL CA: CPT | Performed by: FAMILY MEDICINE

## 2024-05-21 PROCEDURE — 84550 ASSAY OF BLOOD/URIC ACID: CPT | Performed by: FAMILY MEDICINE

## 2024-05-21 PROCEDURE — 36415 COLL VENOUS BLD VENIPUNCTURE: CPT | Performed by: FAMILY MEDICINE

## 2024-05-21 PROCEDURE — 99203 OFFICE O/P NEW LOW 30 MIN: CPT | Performed by: FAMILY MEDICINE

## 2024-05-21 PROCEDURE — 80061 LIPID PANEL: CPT | Performed by: FAMILY MEDICINE

## 2024-05-21 PROCEDURE — 83036 HEMOGLOBIN GLYCOSYLATED A1C: CPT | Performed by: FAMILY MEDICINE

## 2024-05-21 RX ORDER — COLCHICINE 0.6 MG/1
TABLET ORAL
Qty: 32 TABLET | Refills: 0 | Status: SHIPPED | OUTPATIENT
Start: 2024-05-21

## 2024-05-21 ASSESSMENT — PAIN SCALES - GENERAL: PAINLEVEL: MODERATE PAIN (5)

## 2024-05-21 NOTE — PROGRESS NOTES
"  Assessment & Plan     Toe swelling  - REVIEW OF HEALTH MAINTENANCE PROTOCOL ORDERS  - colchicine (COLCRYS) 0.6 MG tablet; Take 2 tablets  now, then 1 tablet an hour later, then 1 each day.  - Basic metabolic panel  (Ca, Cl, CO2, Creat, Gluc, K, Na, BUN); Future  - Uric acid; Future  - Basic metabolic panel  (Ca, Cl, CO2, Creat, Gluc, K, Na, BUN)  - Uric acid    Screening for diabetes mellitus  - Hemoglobin A1c; Future  - Hemoglobin A1c    Lipid screening  - Lipid panel reflex to direct LDL Fasting; Future  - Lipid panel reflex to direct LDL Fasting    Elevated blood pressure reading in office without diagnosis of hypertension      BMI  Estimated body mass index is 29.87 kg/m  as calculated from the following:    Height as of this encounter: 1.893 m (6' 2.53\").    Weight as of this encounter: 107 kg (236 lb).         Issa Madrigal is a 34 year old, presenting for the following health issues:  Foot Pain (urgent care follow up: xray, normal uric acid results)        5/21/2024     1:16 PM   Additional Questions   Roomed by Christie Carver     History of Present Illness       Reason for visit:  Pain and swelling in foot and toe that has persisted for three weeks  Symptom onset:  3-4 weeks ago  Symptoms include:  Pain and swelling in toe and foot area  Symptom intensity:  Moderate  Symptom progression:  Staying the same  Had these symptoms before:  Yes  Has tried/received treatment for these symptoms:  No  What makes it worse:  How long it is persisting  What makes it better:  Ibuprofen lessens symptoms but other than that not really    He eats 2-3 servings of fruits and vegetables daily.He consumes 0 sweetened beverage(s) daily.He exercises with enough effort to increase his heart rate 30 to 60 minutes per day.  He exercises with enough effort to increase his heart rate 6 days per week.   He is taking medications regularly.     Intermittent joint pain  For years     Mainly in the foot     Last 3 weeks ankle pain , " "then foot   Went to urgent care  Improved  with prednisone  Then prednisone ended pain and  swelling came back  Left  great  toe MTP area    Redness currently     Blood pressure is bad today   600mg ibuprofen   3 times a day    Has been taking it for a few  weeks     Uric acid 6.3 form draw 5/12/24 5/12/24 was prednisone     History  of MS           Objective    BP (!) 173/102   Pulse 83   Temp 98.5  F (36.9  C) (Temporal)   Resp (!) 9   Ht 1.893 m (6' 2.53\")   Wt 107 kg (236 lb)   SpO2 100%   BMI 29.87 kg/m    Body mass index is 29.87 kg/m .  Physical Exam  Constitutional:       General: He is not in acute distress.  Eyes:      General: No scleral icterus.  Pulmonary:      Effort: No respiratory distress.   Musculoskeletal:      Comments: Left great mtp  swelling    Neurological:      Mental Status: He is alert.   Psychiatric:         Mood and Affect: Mood normal.         Behavior: Behavior normal.                  Signed Electronically by: Yayo Boone DO    "

## 2024-05-22 LAB
ANION GAP SERPL CALCULATED.3IONS-SCNC: 10 MMOL/L (ref 7–15)
BUN SERPL-MCNC: 11.8 MG/DL (ref 6–20)
CALCIUM SERPL-MCNC: 9.5 MG/DL (ref 8.6–10)
CHLORIDE SERPL-SCNC: 105 MMOL/L (ref 98–107)
CHOLEST SERPL-MCNC: 198 MG/DL
CREAT SERPL-MCNC: 0.95 MG/DL (ref 0.67–1.17)
DEPRECATED HCO3 PLAS-SCNC: 27 MMOL/L (ref 22–29)
EGFRCR SERPLBLD CKD-EPI 2021: >90 ML/MIN/1.73M2
FASTING STATUS PATIENT QL REPORTED: NO
FASTING STATUS PATIENT QL REPORTED: NO
GLUCOSE SERPL-MCNC: 100 MG/DL (ref 70–99)
HDLC SERPL-MCNC: 68 MG/DL
LDLC SERPL CALC-MCNC: 102 MG/DL
NONHDLC SERPL-MCNC: 130 MG/DL
POTASSIUM SERPL-SCNC: 5.3 MMOL/L (ref 3.4–5.3)
SODIUM SERPL-SCNC: 142 MMOL/L (ref 135–145)
TRIGL SERPL-MCNC: 139 MG/DL
URATE SERPL-MCNC: 6.8 MG/DL (ref 3.4–7)

## 2024-06-11 ENCOUNTER — VIRTUAL VISIT (OUTPATIENT)
Dept: FAMILY MEDICINE | Facility: CLINIC | Age: 34
End: 2024-06-11
Payer: COMMERCIAL

## 2024-06-11 DIAGNOSIS — M10.9 ACUTE GOUT INVOLVING TOE OF RIGHT FOOT, UNSPECIFIED CAUSE: Primary | ICD-10-CM

## 2024-06-11 DIAGNOSIS — R03.0 WHITE COAT SYNDROME WITHOUT DIAGNOSIS OF HYPERTENSION: ICD-10-CM

## 2024-06-11 PROCEDURE — 99213 OFFICE O/P EST LOW 20 MIN: CPT | Mod: 95 | Performed by: FAMILY MEDICINE

## 2024-06-11 RX ORDER — PREDNISONE 20 MG/1
40 TABLET ORAL DAILY
Qty: 10 TABLET | Refills: 0 | Status: SHIPPED | OUTPATIENT
Start: 2024-06-11

## 2024-06-11 RX ORDER — ALLOPURINOL 100 MG/1
100-200 TABLET ORAL DAILY
Qty: 60 TABLET | Refills: 2 | Status: SHIPPED | OUTPATIENT
Start: 2024-06-11 | End: 2024-09-13

## 2024-06-11 NOTE — PROGRESS NOTES
"Rohan is a 34 year old who is being evaluated via a billable video visit.          Assessment & Plan     (M10.9) Acute gout involving toe of right foot, unspecified cause  (primary encounter diagnosis)  Comment: 80% improved with toe pain/inflammation. No other flares.   Plan: allopurinol (ZYLOPRIM) 100 MG tablet,         predniSONE (DELTASONE) 20 MG tablet, Uric acid        Discussed stopping colchicine. Starting allopurinol and short course of prednisone. Recheck uric acid 1 month.    (R03.0) White coat syndrome without diagnosis of hypertension  Comment: Checking BP at home last /88  Plan: Instructed to inform us if BP staying above 130/80s.         BMI  Estimated body mass index is 29.87 kg/m  as calculated from the following:    Height as of 5/21/24: 1.893 m (6' 2.53\").    Weight as of 5/21/24: 107 kg (236 lb).       MEDICATIONS:        - Discontinue colchicine        - Start taking Allopurinol and prednisone (5 days)       - Continue other medications without change       - Schedule a non-fasting blood draw 4 weeks    Subjective   Rohan is a 34 year old, presenting for the following health issues:  Arthritis    History of Present Illness       Reason for visit:  Gout flareup followup    He eats 2-3 servings of fruits and vegetables daily.He consumes 0 sweetened beverage(s) daily.He exercises with enough effort to increase his heart rate 30 to 60 minutes per day.  He exercises with enough effort to increase his heart rate 6 days per week.   He is taking medications regularly.       Acute joint arthropathy   Happened in the past lasting a week or so on and off with certain foods     This one has been for almost 2 months  It is 80% better     Most of family on mom's side has gout     Elevated blood pressure   Has been checking it at home   This morning 129/88    Review Of Systems  Skin: negative  Eyes: negative  Ears/Nose/Throat: negative  Respiratory: No shortness of breath, dyspnea on exertion, cough, or " hemoptysis  Cardiovascular: negative  Gastrointestinal: negative  Genitourinary: negative  Musculoskeletal: positive for gout  Neurologic: negative  Psychiatric: negative  Hematologic/Lymphatic/Immunologic: negative  Endocrine: negative       Objective           Vitals:  No vitals were obtained today due to virtual visit.    Physical Exam   GENERAL: alert and no distress  EYES: Eyes grossly normal to inspection.  No discharge or erythema, or obvious scleral/conjunctival abnormalities.  RESP: No audible wheeze, cough, or visible cyanosis.    SKIN: Visible skin clear. No significant rash, abnormal pigmentation or lesions.  NEURO: Cranial nerves grossly intact.  Mentation and speech appropriate for age.  PSYCH: Appropriate affect, tone, and pace of words        Video-Visit Details    Type of service:  Video Visit   Originating Location (pt. Location): Home    Distant Location (provider location):  On-site  Platform used for Video Visit: ARLENE Richards student   Signed Electronically by: Yayo Boone DO

## 2024-07-03 ENCOUNTER — VIRTUAL VISIT (OUTPATIENT)
Dept: FAMILY MEDICINE | Facility: CLINIC | Age: 34
End: 2024-07-03
Payer: COMMERCIAL

## 2024-07-03 DIAGNOSIS — G35 MS (MULTIPLE SCLEROSIS) (H): ICD-10-CM

## 2024-07-03 DIAGNOSIS — M1A.9XX0 CHRONIC GOUT WITHOUT TOPHUS, UNSPECIFIED CAUSE, UNSPECIFIED SITE: ICD-10-CM

## 2024-07-03 DIAGNOSIS — U07.1 INFECTION DUE TO 2019 NOVEL CORONAVIRUS: Primary | ICD-10-CM

## 2024-07-03 PROCEDURE — 99214 OFFICE O/P EST MOD 30 MIN: CPT | Mod: 95

## 2024-07-03 NOTE — PROGRESS NOTES
"Rohan is a 34 year old who is being evaluated via a billable video visit.    How would you like to obtain your AVS? MyChart  If the video visit is dropped, the invitation should be resent by: Text to cell phone: [phone]  Will anyone else be joining your video visit? No      Assessment & Plan     Infection due to 2019 novel coronavirus  Acute. + test at home. Good candidate for Paxlovid treatment as patient is < 5 days of symptom onset, patient has chronic MS which increases their risk for serious illness and/or complications from covid 19 infection. Advised waiting 1 more day, ifsymptoms remain mild I would NOT take paxlovid. Patient has normal kidney function and is prescribed standard Paxlovid dose pack accordingly.  Discussed risks and benefits of Paxlovid. Medication list reviewed with patient for Paxlovid interactions. Medications requiring changes are noted under associated chronic diagnosis below.     - nirmatrelvir and ritonavir (PAXLOVID) 300 mg/100 mg therapy pack  Dispense: 30 tablet; Refill: 0    MS (multiple sclerosis) (H)   Chronic, increases risk of complications from covid taking fingolimod, follows with specialist. Continue med without changes.     Chronic gout without tophus, unspecified cause, unspecified site   Chronic, OK to continue allo[purinol, discussed he cannot take colchicine while taking paxlovid.     BMI  Estimated body mass index is 29.87 kg/m  as calculated from the following:    Height as of 5/21/24: 1.893 m (6' 2.53\").    Weight as of 5/21/24: 107 kg (236 lb).         See Patient Instructions    Subjective   Rohan is a 34 year old, presenting for the following health issues:    Covid Concern        7/3/2024    10:48 AM   Additional Questions   Roomed by Gela ZACARIAS   Accompanied by self         7/3/2024    10:48 AM   Patient Reported Additional Medications   Patient reports taking the following new medications none     History of Present Illness       Reason for visit:  Discuss " potential interactions between paxlovid and fingolimod after a positive covid test.    He eats 2-3 servings of fruits and vegetables daily.He consumes 0 sweetened beverage(s) daily.He exercises with enough effort to increase his heart rate 30 to 60 minutes per day.  He exercises with enough effort to increase his heart rate 6 days per week.   He is taking medications regularly.       COVID-19 Symptom Review  How many days ago did these symptoms start? 7/1/24 (3 days) Tested positive 7/2/24    Are any of the following symptoms significant for you?  New or worsening difficulty breathing? No  Worsening cough? No just a little congestion  Fever or chills? No  Headache: YES  Sore throat: YES  Chest pain: No  Diarrhea: No  Body aches? YES    What treatments has patient tried?  Dayquil, Nyquil   Does patient live in a nursing home, group home, or shelter? No  Does patient have a way to get food/medications during quarantined? NO- he'll have to go pick it up    Chronic MS. Had monoclonal antibodies with last episode of covid.   Symptoms are not as bad as the last time.         Current Outpatient Medications   Medication Instructions    allopurinol (ZYLOPRIM) 100-200 mg, Oral, DAILY    colchicine (COLCRYS) 0.6 MG tablet Take 2 tablets  now, then 1 tablet an hour later, then 1 each day.    fingolimod (GILENYA) 0.5 mg, Oral, DAILY    ibuprofen (ADVIL/MOTRIN) 200 mg, Oral, EVERY 4 HOURS PRN    predniSONE (DELTASONE) 40 mg, Oral, DAILY    vitamin D3 (CHOLECALCIFEROL) 1.25 MG (20996 UT) capsule TAKE 1 CAP WEEKLY THERAFTER              Review of Systems  Constitutional, HEENT, cardiovascular, pulmonary, gi and gu systems are negative, except as otherwise noted.      Objective    Vitals - Patient Reported  Temperature (Patient Reported): 98.8  F (37.1  C)      Vitals:  No vitals were obtained today due to virtual visit.    Physical Exam   GENERAL: alert and no distress  EYES: Eyes grossly normal to inspection.  No discharge or  erythema, or obvious scleral/conjunctival abnormalities.  RESP: No audible wheeze, cough, or visible cyanosis.    SKIN: Visible skin clear. No significant rash, abnormal pigmentation or lesions.  NEURO: Cranial nerves grossly intact.  Mentation and speech appropriate for age.  PSYCH: Appropriate affect, tone, and pace of words          Video-Visit Details    Type of service:  Video Visit   Originating Location (pt. Location): Home    Distant Location (provider location):  On-site  Platform used for Video Visit: Radha  Signed Electronically by: ARLENE Lopez CNP

## 2024-07-03 NOTE — PATIENT INSTRUCTIONS
Start paxlovid starting tomorrow if symptoms are moderate-severe, or worsening. If symptoms are mild/tolerable I do not recommend taking.     Paxlovid is no longer free from the government. There are financial assistance programs available. You can learn more at https://paxlovid.REQQI/ or 1-939.121.4268, press 2 for patient options. Please look into this before you go to the pharmacy to  your medicine.    Only med interaction is colchicine - do not take while taking paxlovid.   The rest of your medications are OK to continue without any changes, there are no interactions      OVER THE COUNTER MEDs and COMFORT MEASURES  NON-Medications  Push hydration (you should be annoyed about peeing frequently)  Hot water + honey, sip on throughout the day  Cool mist humidifier, at bedside   Steam shower for 10-15 minutes 2-3 times per day for comfort.  Nasal saline spray, ok to use as much as every 1 hour    If you take any herbal supplements make sure they are from a reputable company with 3rd party testing.       Mucinex (guaifenesin) 1200 mg twice per day - increase hydration/water intake, this helps keep mucus/phlegm thin so you can clear it more easily. Take for 5 days.     Acetaminophen  (aka Tylenol) 1000 mg up to 3 x per day   [Fever, muscle aches, head/sinus pain, chest tightness/soreness]  (Do not take if you have liver disease, avoid alcohol while taking).  I recommend alternating acetaminophen / ibuprofen every 4 hours    Ibuprofen (aka motrin, Advil) 200-800 mg up to 3 x per day  [Fever, inflammation, congestion, pain/soreness].  (Do not take if you have kidney disease).   If you have heartburn/reflux, take blood thinners, or take a selective serotonin reuptake inhibitor, you should take over the counter famotidine (pepcid) or pantoprazole 20 mg daily (if already taking can increase dose) while you are taking ibuprofen regularly to prevent worsening.      Fluticasone (flonase) nasal spray one time daily  (especially for ear pain, sinus pressure, face pressure, sinus headaches)    OK to continue other over the counter allergy meds

## 2024-07-14 ENCOUNTER — HEALTH MAINTENANCE LETTER (OUTPATIENT)
Age: 34
End: 2024-07-14

## 2024-09-10 ENCOUNTER — TELEPHONE (OUTPATIENT)
Dept: NEUROLOGY | Facility: CLINIC | Age: 34
End: 2024-09-10
Payer: COMMERCIAL

## 2024-09-11 NOTE — TELEPHONE ENCOUNTER
Prior Authorization Approval    Medication: FINGOLIMOD HCL 0.5 MG PO CAPS  Authorization Effective Date: 9/10/2024  Authorization Expiration Date: 9/10/2025  Approved Dose/Quantity: 30 days  Reference #:     Insurance Company: [employer] - Phone [phone] Fax [phone]  Expected CoPay: $    CoPay Card Available:      Financial Assistance Needed:   Which Pharmacy is filling the prescription: [facility] - [place], [place] - [address] [place] [address]  Pharmacy Notified: Yes  Patient Notified: Yes        Thank you,    Margaret Fonseca h-T  Specialty Pharmacy Clinic Liaison - CardiologyNeurologyMultiple Sclerosis  [facility] [address]  [address] [place] [address]  Ph: [phone] Fax: [phone]  [email or web address]       Relayed Dr. Cordero's plan to patient via portal message.

## 2024-09-13 DIAGNOSIS — M10.9 ACUTE GOUT INVOLVING TOE OF RIGHT FOOT, UNSPECIFIED CAUSE: ICD-10-CM

## 2024-09-13 RX ORDER — ALLOPURINOL 100 MG/1
100-200 TABLET ORAL DAILY
Qty: 60 TABLET | Refills: 2 | Status: SHIPPED | OUTPATIENT
Start: 2024-09-13

## 2024-09-13 NOTE — TELEPHONE ENCOUNTER
Pending Prescriptions:                       Disp   Refills    allopurinol (ZYLOPRIM) 100 MG tablet      60 tab*2            Sig: Take 1-2 tablets (100-200 mg) by mouth daily.

## 2024-11-05 ENCOUNTER — HOSPITAL ENCOUNTER (OUTPATIENT)
Dept: MRI IMAGING | Facility: HOSPITAL | Age: 34
Discharge: HOME | End: 2024-11-05
Attending: PSYCHIATRY & NEUROLOGY | Admitting: PSYCHIATRY & NEUROLOGY
Payer: COMMERCIAL

## 2024-11-05 DIAGNOSIS — G35 MS (MULTIPLE SCLEROSIS) (H): ICD-10-CM

## 2024-11-05 PROCEDURE — 72157 MRI CHEST SPINE W/O & W/DYE: CPT

## 2024-11-05 PROCEDURE — 72156 MRI NECK SPINE W/O & W/DYE: CPT

## 2024-11-05 PROCEDURE — 255N000002 HC RX 255 OP 636: Performed by: PSYCHIATRY & NEUROLOGY

## 2024-11-05 PROCEDURE — A9585 GADOBUTROL INJECTION: HCPCS | Performed by: PSYCHIATRY & NEUROLOGY

## 2024-11-05 RX ORDER — GADOBUTROL 604.72 MG/ML
0.1 INJECTION INTRAVENOUS ONCE
Status: COMPLETED | OUTPATIENT
Start: 2024-11-05 | End: 2024-11-05

## 2024-11-05 RX ADMIN — GADOBUTROL 11 ML: 604.72 INJECTION INTRAVENOUS at 11:31

## 2024-12-02 ENCOUNTER — LAB (OUTPATIENT)
Dept: LAB | Facility: CLINIC | Age: 34
End: 2024-12-02
Payer: COMMERCIAL

## 2024-12-02 DIAGNOSIS — G35 MS (MULTIPLE SCLEROSIS) (H): ICD-10-CM

## 2024-12-02 DIAGNOSIS — E55.9 VITAMIN D DEFICIENCY: ICD-10-CM

## 2024-12-02 LAB
BASOPHILS # BLD AUTO: 0 10E3/UL (ref 0–0.2)
BASOPHILS NFR BLD AUTO: 1 %
EOSINOPHIL # BLD AUTO: 0.1 10E3/UL (ref 0–0.7)
EOSINOPHIL NFR BLD AUTO: 3 %
ERYTHROCYTE [DISTWIDTH] IN BLOOD BY AUTOMATED COUNT: 12.7 % (ref 10–15)
HCT VFR BLD AUTO: 45.5 % (ref 40–53)
HGB BLD-MCNC: 15.2 G/DL (ref 13.3–17.7)
IMM GRANULOCYTES # BLD: 0 10E3/UL
IMM GRANULOCYTES NFR BLD: 0 %
LYMPHOCYTES # BLD AUTO: 0.5 10E3/UL (ref 0.8–5.3)
LYMPHOCYTES NFR BLD AUTO: 19 %
MCH RBC QN AUTO: 28.4 PG (ref 26.5–33)
MCHC RBC AUTO-ENTMCNC: 33.4 G/DL (ref 31.5–36.5)
MCV RBC AUTO: 85 FL (ref 78–100)
MONOCYTES # BLD AUTO: 0.5 10E3/UL (ref 0–1.3)
MONOCYTES NFR BLD AUTO: 16 %
NEUTROPHILS # BLD AUTO: 1.8 10E3/UL (ref 1.6–8.3)
NEUTROPHILS NFR BLD AUTO: 61 %
PLATELET # BLD AUTO: 252 10E3/UL (ref 150–450)
RBC # BLD AUTO: 5.36 10E6/UL (ref 4.4–5.9)
WBC # BLD AUTO: 2.9 10E3/UL (ref 4–11)

## 2024-12-02 PROCEDURE — 82306 VITAMIN D 25 HYDROXY: CPT

## 2024-12-02 PROCEDURE — 80076 HEPATIC FUNCTION PANEL: CPT

## 2024-12-02 PROCEDURE — 85025 COMPLETE CBC W/AUTO DIFF WBC: CPT

## 2024-12-02 PROCEDURE — 36415 COLL VENOUS BLD VENIPUNCTURE: CPT

## 2024-12-03 LAB
ALBUMIN SERPL BCG-MCNC: 4.5 G/DL (ref 3.5–5.2)
ALP SERPL-CCNC: 87 U/L (ref 40–150)
ALT SERPL W P-5'-P-CCNC: 69 U/L (ref 0–70)
AST SERPL W P-5'-P-CCNC: 50 U/L (ref 0–45)
BILIRUB DIRECT SERPL-MCNC: <0.2 MG/DL (ref 0–0.3)
BILIRUB SERPL-MCNC: 0.3 MG/DL
PROT SERPL-MCNC: 7 G/DL (ref 6.4–8.3)

## 2024-12-04 ENCOUNTER — OFFICE VISIT (OUTPATIENT)
Dept: NEUROLOGY | Facility: CLINIC | Age: 34
End: 2024-12-04
Attending: PSYCHIATRY & NEUROLOGY
Payer: COMMERCIAL

## 2024-12-04 VITALS
WEIGHT: 246.3 LBS | HEART RATE: 68 BPM | HEIGHT: 74 IN | OXYGEN SATURATION: 99 % | BODY MASS INDEX: 31.61 KG/M2 | DIASTOLIC BLOOD PRESSURE: 98 MMHG | SYSTOLIC BLOOD PRESSURE: 144 MMHG

## 2024-12-04 DIAGNOSIS — E55.9 VITAMIN D DEFICIENCY: ICD-10-CM

## 2024-12-04 DIAGNOSIS — G35 MS (MULTIPLE SCLEROSIS) (H): Primary | ICD-10-CM

## 2024-12-04 LAB — VIT D+METAB SERPL-MCNC: 34 NG/ML (ref 20–50)

## 2024-12-04 PROCEDURE — 99214 OFFICE O/P EST MOD 30 MIN: CPT | Performed by: PSYCHIATRY & NEUROLOGY

## 2024-12-04 RX ORDER — FINGOLIMOD HYDROCHLORIDE 0.5 MG/1
0.5 CAPSULE ORAL DAILY
Qty: 30 CAPSULE | Refills: 11 | Status: SHIPPED | OUTPATIENT
Start: 2024-12-04

## 2024-12-04 ASSESSMENT — PAIN SCALES - GENERAL: PAINLEVEL_OUTOF10: NO PAIN (0)

## 2024-12-04 NOTE — PROGRESS NOTES
"Date of Service: 12/4/2024    [facility] Neurology   MS Clinic Follow-up     Subjective: 34-year-old otherwise healthy man who presents in follow-up for multiple sclerosis.    No new symptoms     Biked the ms 150. Tolerated well. No major symptoms.     Taking fingolimod. Tolerating. No major infections. Covid in July was manageable    Staying active by walking or running on treadmill. Typically goes 5k.     Taking d3 2x5000 international unit(s) daily     Disease onset; R ON, 1/2020, age 29  Last relapse: \"    DMD hx:   Glatiramer acetate 40 mg 2/2020 - 9/2020, radiologic progression  Gilenya 10/14/20 - 12/2022  fingolimod 1/2023 - present (insurance requirement)       No Known Allergies    Current Outpatient Medications   Medication Sig Dispense Refill    allopurinol (ZYLOPRIM) 100 MG tablet Take 1-2 tablets (100-200 mg) by mouth daily. 60 tablet 2    fingolimod (GILENYA) 0.5 MG capsule Take 1 capsule (0.5 mg) by mouth daily 30 capsule 11    ibuprofen (ADVIL/MOTRIN) 200 MG capsule Take 1 capsule (200 mg) by mouth every 4 hours as needed for fever 30 capsule 0    vitamin D3 (CHOLECALCIFEROL) 1.25 MG (60983 UT) capsule TAKE 1 CAP WEEKLY THERAFTER (Patient not taking: Reported on 12/4/2024)       No current facility-administered medications for this visit.        Past medical, surgical, social and family history was personally reviewed. Pertinent details noted above.     Physical Examination:   BP (!) 144/98 (BP Location: Right arm, Patient Position: Sitting, Cuff Size: Adult Regular)   Pulse 68   Ht 1.88 m (6' 2\")   Wt 111.7 kg (246 lb 4.8 oz)   SpO2 99%   BMI 31.62 kg/m      General: no acute distress  Cranial nerves:   VFFC  PERRL w/no RAPD  EOM full w/no NABIL   Face symmetric  Hearing intact  No dysarthria   Motor:   Tone is normal   Bulk is normal     R L  Deltoid  5 5  Biceps  5 5  Triceps 5 5  Wrist ext 5 5  Finger ext 5 5  Finger abd 5 5    Hip flexion 5 5  Knee flexion 5 5  Knee ext 5 5  Ankle " d/f 5 5    Reflexes: 2+ and symmetric throughout, babinski absent bilaterally  Sensory: vibration is minimally reduced in the toes, JPS normal in the toes   Romberg is absent  Coordination: no ataxia or dysmetria  Gait: normal base and stride, tandem gait is intact, able to balance on one foot and hop x 5 bilaterally, able to get up from chair with single-leg but a bit more unsteady with left compared to right    Tests/Imaging:   CSF   9 OCB  3 wbc  IgG index normal     MOG neg  AQP4 neg    NESSA virus Ab 0.27 reflex negative   Vitamin D 59    Abs lymph 500   ANC 1300    MRI brain   1/2020 - several periventricular demyelinating lesions, 1 gd+; longitudinally extensive enhancement of the right optic nerve  7/2020 - 2 gd+ lesions  4/2021 - no new lesions, gd-   6/2022 - no new lesions, gd-   5/2023- no new lesions, gd-  11/2024 (7T) stable, no PRL      MRI cervical spine   1/2020 - no demyelinating lesions  6/2022 - no definite lesions,2 spots but favor artifact b/c only noted on one view  5/2023 - no new lesions,gd-  11/2024 - no new lesions    MRI thoracic spine   1/2020 - no demyelinating lesions   6/2022 - no lesions  11/2024 - no new lesions    Assessment: 34-year-old man with relapsing remitting multiple sclerosis who remains clinically and radiologically stable on fingolimod.    Examination is excellent     Discussed physical activity advise for MS and aging     Encouraged skin exam due to risk for skin cancer with chronic use of fingolimod     Mild elevation in ast not concerning.     Plan:   - continue fingolimod   - vitamin D level added on   - follow up in 1 year    Note was completed with the assistance of Dragon Fluency software which can often result in accidental word substitutions.     A total of 30 minutes on the date of service were spent in the care of this patient.   Elizabeth Man MD on 12/4/2024 at 11:16 AM

## 2024-12-04 NOTE — PATIENT INSTRUCTIONS
Continue fingolimod     Vitamin d level added on     Have a skin check (primary care or dermatology)     Follow up in 1 year

## 2024-12-04 NOTE — NURSING NOTE
Chief Complaint   Patient presents with    MS    RECHECK     Annual follow up      Vitals were taken and medications were reconciled.   Patric Finch, EMT  11:06 AM

## 2024-12-04 NOTE — LETTER
"12/4/2024       RE: Rohan Patterson  [address] [place] [address]     Dear Colleague,    Thank you for referring your patient, Rohan Patterson, to the [facility] [place] [facility]. Please see a copy of my visit note below.    Date of Service: 12/4/2024    [facility] Neurology   MS Clinic Follow-up     Subjective: 34-year-old otherwise healthy man who presents in follow-up for multiple sclerosis.    No new symptoms     Biked the ms 150. Tolerated well. No major symptoms.     Taking fingolimod. Tolerating. No major infections. Covid in July was manageable    Staying active by walking or running on treadmill. Typically goes 5k.     Taking d3 2x5000 international unit(s) daily     Disease onset; R ON, 1/2020, age 29  Last relapse: \"    DMD hx:   Glatiramer acetate 40 mg 2/2020 - 9/2020, radiologic progression  Gilenya 10/14/20 - 12/2022  fingolimod 1/2023 - present (insurance requirement)       No Known Allergies    Current Outpatient Medications   Medication Sig Dispense Refill     allopurinol (ZYLOPRIM) 100 MG tablet Take 1-2 tablets (100-200 mg) by mouth daily. 60 tablet 2     fingolimod (GILENYA) 0.5 MG capsule Take 1 capsule (0.5 mg) by mouth daily 30 capsule 11     ibuprofen (ADVIL/MOTRIN) 200 MG capsule Take 1 capsule (200 mg) by mouth every 4 hours as needed for fever 30 capsule 0     vitamin D3 (CHOLECALCIFEROL) 1.25 MG (52892 UT) capsule TAKE 1 CAP WEEKLY THERAFTER (Patient not taking: Reported on 12/4/2024)       No current facility-administered medications for this visit.        Past medical, surgical, social and family history was personally reviewed. Pertinent details noted above.     Physical Examination:   BP (!) 144/98 (BP Location: Right arm, Patient Position: Sitting, Cuff Size: Adult Regular)   Pulse 68   Ht 1.88 m (6' 2\")   Wt 111.7 kg (246 lb 4.8 oz)   SpO2 99%   BMI 31.62 kg/m      General: no acute " distress  Cranial nerves:   VFFC  PERRL w/no RAPD  EOM full w/no NABIL   Face symmetric  Hearing intact  No dysarthria   Motor:   Tone is normal   Bulk is normal     R L  Deltoid  5 5  Biceps  5 5  Triceps 5 5  Wrist ext 5 5  Finger ext 5 5  Finger abd 5 5    Hip flexion 5 5  Knee flexion 5 5  Knee ext 5 5  Ankle d/f 5 5    Reflexes: 2+ and symmetric throughout, babinski absent bilaterally  Sensory: vibration is minimally reduced in the toes, JPS normal in the toes   Romberg is absent  Coordination: no ataxia or dysmetria  Gait: normal base and stride, tandem gait is intact, able to balance on one foot and hop x 5 bilaterally, able to get up from chair with single-leg but a bit more unsteady with left compared to right    Tests/Imaging:   CSF   9 OCB  3 wbc  IgG index normal     MOG neg  AQP4 neg    NESSA virus Ab 0.27 reflex negative   Vitamin D 59    Abs lymph 500   ANC 1300    MRI brain   1/2020 - several periventricular demyelinating lesions, 1 gd+; longitudinally extensive enhancement of the right optic nerve  7/2020 - 2 gd+ lesions  4/2021 - no new lesions, gd-   6/2022 - no new lesions, gd-   5/2023- no new lesions, gd-  11/2024 (7T) stable, no PRL      MRI cervical spine   1/2020 - no demyelinating lesions  6/2022 - no definite lesions,2 spots but favor artifact b/c only noted on one view  5/2023 - no new lesions,gd-  11/2024 - no new lesions    MRI thoracic spine   1/2020 - no demyelinating lesions   6/2022 - no lesions  11/2024 - no new lesions    Assessment: 34-year-old man with relapsing remitting multiple sclerosis who remains clinically and radiologically stable on fingolimod.    Examination is excellent     Discussed physical activity advise for MS and aging     Encouraged skin exam due to risk for skin cancer with chronic use of fingolimod     Mild elevation in ast not concerning.     Plan:   - continue fingolimod   - vitamin D level added on   - follow up in 1 year    Note was completed with the  assistance of Dragon Fluency software which can often result in accidental word substitutions.     A total of 30 minutes on the date of service were spent in the care of this patient.   Elizabeth Man MD on 12/4/2024 at 11:16 AM              Again, thank you for allowing me to participate in the care of your patient.      Sincerely,    Elizabeth Man MD

## 2024-12-23 ENCOUNTER — MYC REFILL (OUTPATIENT)
Dept: FAMILY MEDICINE | Facility: CLINIC | Age: 34
End: 2024-12-23
Payer: COMMERCIAL

## 2024-12-23 DIAGNOSIS — M10.9 ACUTE GOUT INVOLVING TOE OF RIGHT FOOT, UNSPECIFIED CAUSE: ICD-10-CM

## 2024-12-24 RX ORDER — ALLOPURINOL 100 MG/1
100-200 TABLET ORAL DAILY
Qty: 60 TABLET | Refills: 2 | Status: SHIPPED | OUTPATIENT
Start: 2024-12-24

## 2025-02-26 ENCOUNTER — MYC REFILL (OUTPATIENT)
Dept: FAMILY MEDICINE | Facility: CLINIC | Age: 35
End: 2025-02-26
Payer: COMMERCIAL

## 2025-02-26 DIAGNOSIS — M10.9 ACUTE GOUT INVOLVING TOE OF RIGHT FOOT, UNSPECIFIED CAUSE: ICD-10-CM

## 2025-02-27 RX ORDER — ALLOPURINOL 100 MG/1
100-200 TABLET ORAL DAILY
Qty: 60 TABLET | Refills: 2 | Status: SHIPPED | OUTPATIENT
Start: 2025-02-27

## 2025-07-19 ENCOUNTER — HEALTH MAINTENANCE LETTER (OUTPATIENT)
Age: 35
End: 2025-07-19

## 2025-08-12 ENCOUNTER — TELEPHONE (OUTPATIENT)
Dept: NEUROLOGY | Facility: CLINIC | Age: 35
End: 2025-08-12
Payer: COMMERCIAL